# Patient Record
Sex: MALE | Race: WHITE | Employment: FULL TIME | URBAN - METROPOLITAN AREA
[De-identification: names, ages, dates, MRNs, and addresses within clinical notes are randomized per-mention and may not be internally consistent; named-entity substitution may affect disease eponyms.]

---

## 2017-01-23 ENCOUNTER — TELEPHONE (OUTPATIENT)
Dept: PHARMACY | Facility: CLINIC | Age: 22
End: 2017-01-23

## 2020-01-04 ENCOUNTER — HOSPITAL ENCOUNTER (OUTPATIENT)
Dept: SLEEP CENTER | Age: 25
Discharge: HOME | End: 2020-01-04
Payer: COMMERCIAL

## 2020-01-04 PROCEDURE — 95810 POLYSOM 6/> YRS 4/> PARAM: CPT

## 2020-01-05 VITALS
SYSTOLIC BLOOD PRESSURE: 151 MMHG | BODY MASS INDEX: 27.83 KG/M2 | OXYGEN SATURATION: 98 % | WEIGHT: 210 LBS | HEIGHT: 73 IN | DIASTOLIC BLOOD PRESSURE: 104 MMHG | HEART RATE: 87 BPM

## 2020-01-05 ASSESSMENT — SLEEP AND FATIGUE QUESTIONNAIRES
HOW LIKELY ARE YOU TO NOD OFF OR FALL ASLEEP WHEN YOU ARE A PASSENGER IN A CAR FOR AN HOUR WITHOUT A BREAK: 1
HOW LIKELY ARE YOU TO NOD OFF OR FALL ASLEEP WHILE SITTING AND TALKING TO SOMEONE: 0
ESS TOTAL SCORE: 8
HOW LIKELY ARE YOU TO NOD OFF OR FALL ASLEEP WHILE SITTING AND READING: 2
HOW LIKELY ARE YOU TO NOD OFF OR FALL ASLEEP WHILE WATCHING TV: 2
HOW LIKELY ARE YOU TO NOD OFF OR FALL ASLEEP WHILE LYING DOWN TO REST IN THE AFTERNOON WHEN CIRCUMSTANCES PERMIT: 2
HOW LIKELY ARE YOU TO NOD OFF OR FALL ASLEEP WHILE SITTING INACTIVE IN A PUBLIC PLACE: 0
HOW LIKELY ARE YOU TO NOD OFF OR FALL ASLEEP IN A CAR, WHILE STOPPED FOR A FEW MINUTES IN TRAFFIC: 0
HOW LIKELY ARE YOU TO NOD OFF OR FALL ASLEEP WHILE SITTING QUIETLY AFTER LUNCH WITHOUT ALCOHOL: 1

## 2020-01-07 NOTE — PROGRESS NOTES
[address] [address]                               SLEEP STUDY REPORT    PATIENT NAME: Hudson Thompson                :        1995  MED REC NO:   [id number]                            ROOM:  ACCOUNT NO:   [de-identified]                           ADMIT DATE: 2020  PROVIDER:     Marta Coleman MD    DATE OF STUDY:  2020    REFERRING PROVIDER:  Patient of Blue Moe MD    INDICATION FOR POLYSOMNOGRAPHY:  Witnessed apnea, snoring, nocturnal  diaphoresis, bruxism, frequent waking to urinate, sleep walking, eating  while sleeping. CURRENT MEDICATIONS:  Humalog, Nanine Maser. INTERPRETATION:  SLEEP ARCHITECTURE:  This patient had a total time in bed of 472  minutes. Total sleep time was 400 minutes. Sleep efficiency was 85%. Sleep latency was 53 minutes. REM latency was 101 minutes. SLEEP STAGING:  The patient was awake 15% of the time in bed. Stage N1  was 4% and N2 was 58% of the total sleep time. Slow wave sleep was 18%  of the sleep time and stage REM sleep was 20% of the sleep time. RESPIRATION SUMMARY:  APNEA:  There were 3 apneic events including 1 central and 2  obstructive, all occurred during non-REM stage sleep, and maximum  duration was 13 seconds. HYPOPNEA:  There were 50 hypopneic events, 19 occurred during REM stage  sleep. Maximum duration was 56 seconds. APNEA/HYPOPNEA INDEX:  The apnea/hypopnea index is 8. Of the 53  respiratory events, 35 occurred in the supine position. AROUSAL ANALYSIS:  There were 31 arousals/awakenings, the sleep  disruption index is normal.    LIMB MOVEMENT SUMMARY:  There was 1 limb movement, the limb movement  index is normal.    OXYGEN SATURATION:  Average oxygen saturation while awake was 97%. Lowest saturation was 88%. The patient spent less than 1% of the time  with saturation less than 90%.     HEART RATE SUMMARY:  Average heart rate while awake was 92 beats per  minute. Maximum heart rate during sleep was 121 beats per minute, and  minimum heart rate during sleep was 62 beats per minute. MISCELLANEOUS:  [place] Sleepiness Scale score is 8/24. Snoring was  mild. This was graded as 3 on a 1 to 10 scale. There was no apparent  bruxism. IMPRESSION:  1. Mild obstructive sleep apnea. 2.  Good oxygen saturation. 3.  No cardiac dysrhythmia. 4.  Possible parasomnia with sleep walking and sleep eating. 5.  Mild snoring. DISCUSSION:  This patient has an apnea/hypopnea index of 8. Normal is  less than 5, and mild is 5-15 leaving this patient in the mild category. When the [place] Sleepiness Scale score is less than 10, frequently we  do not treat mild disease. However, there are some exceptions, and  therefore, before making a final decision, the patient will be seen in  the office. PLAN:  1. No treatment at this time. 2.  The patient to be seen in the office to discuss results of the study  and determine if further evaluation/treatment is indicated.         Pedrito Greenberg MD  Diplomat of Sleep Medicine    D: 01/06/2020 19:53:13       T: 01/06/2020 19:56:37     [id number]  Job#: [id number]     Doc#: [id number]    CC:

## 2020-06-02 ENCOUNTER — HOSPITAL ENCOUNTER (OUTPATIENT)
Age: 25
Discharge: HOME | End: 2020-06-04
Payer: COMMERCIAL

## 2020-06-02 LAB
ALBUMIN SERPL-MCNC: 4.3 G/DL (ref 3.5–5.2)
ALP BLD-CCNC: 102 U/L (ref 40–129)
ALT SERPL-CCNC: 67 U/L (ref 0–40)
ANION GAP SERPL CALCULATED.3IONS-SCNC: 21 MMOL/L (ref 7–16)
AST SERPL-CCNC: 53 U/L (ref 0–39)
BILIRUB SERPL-MCNC: 0.6 MG/DL (ref 0–1.2)
BUN BLDV-MCNC: 13 MG/DL (ref 6–20)
CALCIUM SERPL-MCNC: 9.4 MG/DL (ref 8.6–10.2)
CHLORIDE BLD-SCNC: 99 MMOL/L (ref 98–107)
CHOLESTEROL, TOTAL: 230 MG/DL (ref 0–199)
CO2: 19 MMOL/L (ref 22–29)
CREAT SERPL-MCNC: 1 MG/DL (ref 0.7–1.2)
GFR AFRICAN AMERICAN: >60
GFR NON-AFRICAN AMERICAN: >60 ML/MIN/1.73
GLUCOSE BLD-MCNC: 401 MG/DL (ref 74–99)
HBA1C MFR BLD: 8.7 % (ref 4–5.6)
HDLC SERPL-MCNC: 29 MG/DL
LDL CHOLESTEROL CALCULATED: 136 MG/DL (ref 0–99)
POTASSIUM SERPL-SCNC: 5 MMOL/L (ref 3.5–5)
SODIUM BLD-SCNC: 139 MMOL/L (ref 132–146)
TOTAL PROTEIN: 7.1 G/DL (ref 6.4–8.3)
TRIGL SERPL-MCNC: 327 MG/DL (ref 0–149)
TSH SERPL DL<=0.05 MIU/L-ACNC: 2.84 UIU/ML (ref 0.27–4.2)
VITAMIN D 25-HYDROXY: 23 NG/ML (ref 30–100)
VLDLC SERPL CALC-MCNC: 65 MG/DL

## 2020-06-02 PROCEDURE — 80053 COMPREHEN METABOLIC PANEL: CPT

## 2020-06-02 PROCEDURE — 80061 LIPID PANEL: CPT

## 2020-06-02 PROCEDURE — 82306 VITAMIN D 25 HYDROXY: CPT

## 2020-06-02 PROCEDURE — 83036 HEMOGLOBIN GLYCOSYLATED A1C: CPT

## 2020-06-02 PROCEDURE — 84443 ASSAY THYROID STIM HORMONE: CPT

## 2020-12-04 LAB
ALBUMIN SERPL-MCNC: 4.6 G/DL (ref 3.5–5.2)
ALP BLD-CCNC: 89 U/L (ref 40–129)
ALT SERPL-CCNC: 28 U/L (ref 0–40)
ANION GAP SERPL CALCULATED.3IONS-SCNC: 14 MMOL/L (ref 7–16)
AST SERPL-CCNC: 24 U/L (ref 0–39)
BASOPHILS ABSOLUTE: 0.03 E9/L (ref 0–0.2)
BASOPHILS RELATIVE PERCENT: 0.4 % (ref 0–2)
BILIRUB SERPL-MCNC: 0.4 MG/DL (ref 0–1.2)
BUN BLDV-MCNC: 12 MG/DL (ref 6–20)
CALCIUM SERPL-MCNC: 9.9 MG/DL (ref 8.6–10.2)
CHLORIDE BLD-SCNC: 104 MMOL/L (ref 98–107)
CHOLESTEROL, TOTAL: 225 MG/DL (ref 0–199)
CO2: 25 MMOL/L (ref 22–29)
CREAT SERPL-MCNC: 0.9 MG/DL (ref 0.7–1.2)
EOSINOPHILS ABSOLUTE: 0.07 E9/L (ref 0.05–0.5)
EOSINOPHILS RELATIVE PERCENT: 0.9 % (ref 0–6)
GFR AFRICAN AMERICAN: >60
GFR NON-AFRICAN AMERICAN: >60 ML/MIN/1.73
GLUCOSE BLD-MCNC: 43 MG/DL (ref 74–99)
HCT VFR BLD CALC: 47.2 % (ref 37–54)
HDLC SERPL-MCNC: 36 MG/DL
HEMOGLOBIN: 15.6 G/DL (ref 12.5–16.5)
IMMATURE GRANULOCYTES #: 0.01 E9/L
IMMATURE GRANULOCYTES %: 0.1 % (ref 0–5)
LDL CHOLESTEROL CALCULATED: 140 MG/DL (ref 0–99)
LYMPHOCYTES ABSOLUTE: 2.59 E9/L (ref 1.5–4)
LYMPHOCYTES RELATIVE PERCENT: 34 % (ref 20–42)
MCH RBC QN AUTO: 30.6 PG (ref 26–35)
MCHC RBC AUTO-ENTMCNC: 33.1 % (ref 32–34.5)
MCV RBC AUTO: 92.7 FL (ref 80–99.9)
MONOCYTES ABSOLUTE: 0.37 E9/L (ref 0.1–0.95)
MONOCYTES RELATIVE PERCENT: 4.9 % (ref 2–12)
NEUTROPHILS ABSOLUTE: 4.55 E9/L (ref 1.8–7.3)
NEUTROPHILS RELATIVE PERCENT: 59.7 % (ref 43–80)
PDW BLD-RTO: 12.5 FL (ref 11.5–15)
PLATELET # BLD: 168 E9/L (ref 130–450)
PMV BLD AUTO: 13.7 FL (ref 7–12)
POTASSIUM SERPL-SCNC: 4 MMOL/L (ref 3.5–5)
RBC # BLD: 5.09 E12/L (ref 3.8–5.8)
SODIUM BLD-SCNC: 143 MMOL/L (ref 132–146)
TOTAL PROTEIN: 7.3 G/DL (ref 6.4–8.3)
TRIGL SERPL-MCNC: 247 MG/DL (ref 0–149)
TSH SERPL DL<=0.05 MIU/L-ACNC: 1.91 UIU/ML (ref 0.27–4.2)
VITAMIN D 25-HYDROXY: 22 NG/ML (ref 30–100)
VLDLC SERPL CALC-MCNC: 49 MG/DL
WBC # BLD: 7.6 E9/L (ref 4.5–11.5)

## 2020-12-05 LAB — HBA1C MFR BLD: 8.5 % (ref 4–5.6)

## 2021-06-04 LAB
ALBUMIN SERPL-MCNC: 4.3 G/DL (ref 3.5–5.2)
ALP BLD-CCNC: 84 U/L (ref 40–129)
ALT SERPL-CCNC: 32 U/L (ref 0–40)
ANION GAP SERPL CALCULATED.3IONS-SCNC: 13 MMOL/L (ref 7–16)
AST SERPL-CCNC: 26 U/L (ref 0–39)
BILIRUB SERPL-MCNC: 0.3 MG/DL (ref 0–1.2)
BUN BLDV-MCNC: 12 MG/DL (ref 6–20)
CALCIUM SERPL-MCNC: 9.6 MG/DL (ref 8.6–10.2)
CHLORIDE BLD-SCNC: 102 MMOL/L (ref 98–107)
CHOLESTEROL, TOTAL: 223 MG/DL (ref 0–199)
CO2: 24 MMOL/L (ref 22–29)
CREAT SERPL-MCNC: 0.8 MG/DL (ref 0.7–1.2)
GFR AFRICAN AMERICAN: >60
GFR NON-AFRICAN AMERICAN: >60 ML/MIN/1.73
GLUCOSE BLD-MCNC: 149 MG/DL (ref 74–99)
HBA1C MFR BLD: 8.3 % (ref 4–5.6)
HDLC SERPL-MCNC: 33 MG/DL
LDL CHOLESTEROL CALCULATED: 135 MG/DL (ref 0–99)
POTASSIUM SERPL-SCNC: 4.7 MMOL/L (ref 3.5–5)
SODIUM BLD-SCNC: 139 MMOL/L (ref 132–146)
TOTAL PROTEIN: 7 G/DL (ref 6.4–8.3)
TRIGL SERPL-MCNC: 275 MG/DL (ref 0–149)
TSH SERPL DL<=0.05 MIU/L-ACNC: 2.71 UIU/ML (ref 0.27–4.2)
VITAMIN D 25-HYDROXY: 24 NG/ML (ref 30–100)
VLDLC SERPL CALC-MCNC: 55 MG/DL

## 2021-09-10 ENCOUNTER — HOSPITAL ENCOUNTER (EMERGENCY)
Age: 26
Discharge: HOME | End: 2021-09-10
Attending: EMERGENCY MEDICINE
Payer: COMMERCIAL

## 2021-09-10 VITALS
HEART RATE: 116 BPM | SYSTOLIC BLOOD PRESSURE: 154 MMHG | TEMPERATURE: 98.4 F | BODY MASS INDEX: 27.09 KG/M2 | OXYGEN SATURATION: 98 % | RESPIRATION RATE: 16 BRPM | HEIGHT: 72 IN | WEIGHT: 200 LBS | DIASTOLIC BLOOD PRESSURE: 99 MMHG

## 2021-09-10 DIAGNOSIS — Z91.89 AT INCREASED RISK OF EXPOSURE TO COVID-19 VIRUS: Primary | ICD-10-CM

## 2021-09-10 LAB — SARS-COV-2, NAAT: NOT DETECTED

## 2021-09-10 PROCEDURE — 87635 SARS-COV-2 COVID-19 AMP PRB: CPT

## 2021-09-10 PROCEDURE — 99282 EMERGENCY DEPT VISIT SF MDM: CPT

## 2021-09-10 ASSESSMENT — ENCOUNTER SYMPTOMS
EYE DISCHARGE: 0
EYE PAIN: 0
BACK PAIN: 0
WHEEZING: 0
NAUSEA: 0
EYE REDNESS: 0
SINUS PRESSURE: 0
DIARRHEA: 0
VOMITING: 0
SORE THROAT: 0
ABDOMINAL PAIN: 0
COUGH: 0
SHORTNESS OF BREATH: 0

## 2021-09-10 NOTE — ED PROVIDER NOTES
81-year-old male presents to the emergency department after a high risk Covid exposure. The patient has nasal congestion but no fever. Patient does have history of type 1 diabetes he is denies nausea vomiting diarrhea or abnormal blood sugars. He states no other complaints at this time. The history is provided by the patient. URI  Presenting symptoms: congestion    Presenting symptoms: no cough, no ear pain, no fever and no sore throat    Severity:  Mild  Onset quality:  Gradual  Duration:  1 day  Timing:  Intermittent  Progression:  Waxing and waning  Chronicity:  New  Relieved by:  Nothing  Worsened by:  Nothing  Ineffective treatments:  None tried  Associated symptoms: no arthralgias, no headaches and no wheezing    Risk factors: recent illness         Review of Systems   Constitutional: Negative for chills and fever. HENT: Positive for congestion. Negative for ear pain, sinus pressure and sore throat. Eyes: Negative for pain, discharge and redness. Respiratory: Negative for cough, shortness of breath and wheezing. Cardiovascular: Negative for chest pain. Gastrointestinal: Negative for abdominal pain, diarrhea, nausea and vomiting. Genitourinary: Negative for dysuria and frequency. Musculoskeletal: Negative for arthralgias and back pain. Skin: Negative for rash and wound. Neurological: Negative for weakness and headaches. Hematological: Negative for adenopathy. All other systems reviewed and are negative. Physical Exam  Constitutional:       Appearance: Normal appearance. HENT:      Head: Normocephalic and atraumatic. Nose: Nose normal.   Eyes:      Extraocular Movements: Extraocular movements intact. Pupils: Pupils are equal, round, and reactive to light. Cardiovascular:      Rate and Rhythm: Normal rate and regular rhythm. Pulses: Normal pulses. Heart sounds: Normal heart sounds.    Pulmonary:      Effort: Pulmonary effort is normal.      Breath sounds: Normal breath sounds. Abdominal:      General: Abdomen is flat. Bowel sounds are normal. There is no distension. Palpations: Abdomen is soft. Tenderness: There is no abdominal tenderness. There is no guarding. Musculoskeletal:         General: Normal range of motion. Skin:     General: Skin is warm. Capillary Refill: Capillary refill takes less than 2 seconds. Neurological:      General: No focal deficit present. Mental Status: He is alert and oriented to person, place, and time. Procedures     MDM  Number of Diagnoses or Management Options  At increased risk of exposure to COVID-19 virus  Diagnosis management comments: Patient seen and examined he is not hypoxic Covid test sent patient was discharged with outpatient follow-up.             --------------------------------------------- PAST HISTORY ---------------------------------------------  Past Medical History:  has a past medical history of Diabetes mellitus ([facility] [address] and Lactic acid increased. Past Surgical History:  has a past surgical history that includes Appendectomy. Social History:  reports that he has never smoked. His smokeless tobacco use includes chew. He reports that he does not drink alcohol. Family History: family history is not on file. The patients home medications have been reviewed. Allergies: Niacin and related    -------------------------------------------------- RESULTS -------------------------------------------------  Labs:  No results found for this visit on 09/10/21. Radiology:  No orders to display       ------------------------- NURSING NOTES AND VITALS REVIEWED ---------------------------  Date / Time Roomed:  9/10/2021  6:20 PM  ED Bed Assignment:  29/29    The nursing notes within the ED encounter and vital signs as below have been reviewed.    BP (!) 154/99   Pulse 116   Temp 98.4 °F (36.9 °C)   Resp 16   Ht 6' (1.829 m)   Wt 200 lb (90.7 kg)   SpO2 98%   BMI 27.12 kg/m²   Oxygen Saturation Interpretation: Normal      ------------------------------------------ PROGRESS NOTES ------------------------------------------  I have spoken with the patient and discussed todays results, in addition to providing specific details for the plan of care and counseling regarding the diagnosis and prognosis. Their questions are answered at this time and they are agreeable with the plan. I discussed at length with them reasons for immediate return here for re evaluation. They will followup with their primary care physician by calling their office tomorrow. --------------------------------- ADDITIONAL PROVIDER NOTES ---------------------------------  At this time the patient is without objective evidence of an acute process requiring hospitalization or inpatient management. They have remained hemodynamically stable throughout their entire ED visit and are stable for discharge with outpatient follow-up. The plan has been discussed in detail and they are aware of the specific conditions for emergent return, as well as the importance of follow-up. New Prescriptions    No medications on file       Diagnosis:  1. At increased risk of exposure to COVID-19 virus        Disposition:  Patient's disposition: Discharge to home  Patient's condition is stable.        Themarcio Gamboa, DO  09/10/21 [address], DO  09/10/21 0807

## 2021-09-13 ENCOUNTER — CARE COORDINATION (OUTPATIENT)
Dept: OTHER | Facility: CLINIC | Age: 26
End: 2021-09-13

## 2021-09-13 NOTE — CARE COORDINATION
[address] [facility] ED Follow Up Call    2021    Patient: Rosio Choudhary Patient : 1995   MRN: [id number]  Reason for Admission: At Increased Risk of exposure to covid 19  Discharge Date: 9/10/21      Date/Time:  2021 2:22 PM  Attempted to reach patient by telephone. Call within 2 business days of discharge: Yes  Spoke to patient's mother who reports patient is fine. Requested mom provide patient with this CTN's contact info. [employer] message sent. This CTN to sign off if patient does not return call.

## 2021-09-24 ENCOUNTER — APPOINTMENT (OUTPATIENT)
Dept: GENERAL RADIOLOGY | Age: 26
End: 2021-09-24
Payer: COMMERCIAL

## 2021-09-24 ENCOUNTER — HOSPITAL ENCOUNTER (EMERGENCY)
Age: 26
Discharge: HOME | End: 2021-09-24
Attending: EMERGENCY MEDICINE
Payer: COMMERCIAL

## 2021-09-24 VITALS
OXYGEN SATURATION: 97 % | HEIGHT: 72 IN | BODY MASS INDEX: 24.38 KG/M2 | HEART RATE: 90 BPM | SYSTOLIC BLOOD PRESSURE: 124 MMHG | RESPIRATION RATE: 16 BRPM | WEIGHT: 180 LBS | TEMPERATURE: 98.3 F | DIASTOLIC BLOOD PRESSURE: 77 MMHG

## 2021-09-24 DIAGNOSIS — J18.9 PNEUMONIA DUE TO INFECTIOUS ORGANISM, UNSPECIFIED LATERALITY, UNSPECIFIED PART OF LUNG: ICD-10-CM

## 2021-09-24 DIAGNOSIS — U07.1 COVID-19: Primary | ICD-10-CM

## 2021-09-24 DIAGNOSIS — R73.9 HYPERGLYCEMIA: ICD-10-CM

## 2021-09-24 LAB
ALBUMIN SERPL-MCNC: 4 G/DL (ref 3.5–5.2)
ALP BLD-CCNC: 75 U/L (ref 40–129)
ALT SERPL-CCNC: 24 U/L (ref 0–40)
ANION GAP SERPL CALCULATED.3IONS-SCNC: 19 MMOL/L (ref 7–16)
AST SERPL-CCNC: 25 U/L (ref 0–39)
BACTERIA: ABNORMAL /HPF
BASOPHILS ABSOLUTE: 0.01 E9/L (ref 0–0.2)
BASOPHILS RELATIVE PERCENT: 0.2 % (ref 0–2)
BETA-HYDROXYBUTYRATE: 2.7 MMOL/L (ref 0.02–0.27)
BILIRUB SERPL-MCNC: 0.5 MG/DL (ref 0–1.2)
BILIRUBIN URINE: ABNORMAL
BLOOD, URINE: ABNORMAL
BUN BLDV-MCNC: 17 MG/DL (ref 6–20)
C-REACTIVE PROTEIN: 4 MG/DL (ref 0–0.4)
CALCIUM SERPL-MCNC: 8.8 MG/DL (ref 8.6–10.2)
CHLORIDE BLD-SCNC: 94 MMOL/L (ref 98–107)
CLARITY: CLEAR
CO2: 20 MMOL/L (ref 22–29)
COLOR: YELLOW
CREAT SERPL-MCNC: 1.1 MG/DL (ref 0.7–1.2)
D DIMER: <200 NG/ML DDU
EKG ATRIAL RATE: 97 BPM
EKG P AXIS: 55 DEGREES
EKG P-R INTERVAL: 148 MS
EKG Q-T INTERVAL: 348 MS
EKG QRS DURATION: 80 MS
EKG QTC CALCULATION (BAZETT): 441 MS
EKG R AXIS: 29 DEGREES
EKG T AXIS: 23 DEGREES
EKG VENTRICULAR RATE: 97 BPM
EOSINOPHILS ABSOLUTE: 0 E9/L (ref 0.05–0.5)
EOSINOPHILS RELATIVE PERCENT: 0 % (ref 0–6)
FERRITIN: 684 NG/ML
GFR AFRICAN AMERICAN: >60
GFR NON-AFRICAN AMERICAN: >60 ML/MIN/1.73
GLUCOSE BLD-MCNC: 308 MG/DL (ref 74–99)
GLUCOSE URINE: >=1000 MG/DL
HCT VFR BLD CALC: 44.9 % (ref 37–54)
HEMOGLOBIN: 15.6 G/DL (ref 12.5–16.5)
IMMATURE GRANULOCYTES #: 0.02 E9/L
IMMATURE GRANULOCYTES %: 0.4 % (ref 0–5)
KETONES, URINE: >=80 MG/DL
LACTATE DEHYDROGENASE: 201 U/L (ref 135–225)
LACTIC ACID: 3.6 MMOL/L (ref 0.5–2.2)
LEUKOCYTE ESTERASE, URINE: NEGATIVE
LYMPHOCYTES ABSOLUTE: 1.18 E9/L (ref 1.5–4)
LYMPHOCYTES RELATIVE PERCENT: 23 % (ref 20–42)
MCH RBC QN AUTO: 30.8 PG (ref 26–35)
MCHC RBC AUTO-ENTMCNC: 34.7 % (ref 32–34.5)
MCV RBC AUTO: 88.6 FL (ref 80–99.9)
MONOCYTES ABSOLUTE: 0.38 E9/L (ref 0.1–0.95)
MONOCYTES RELATIVE PERCENT: 7.4 % (ref 2–12)
NEUTROPHILS ABSOLUTE: 3.54 E9/L (ref 1.8–7.3)
NEUTROPHILS RELATIVE PERCENT: 69 % (ref 43–80)
NITRITE, URINE: NEGATIVE
PDW BLD-RTO: 12.3 FL (ref 11.5–15)
PH UA: 5.5 (ref 5–9)
PH VENOUS: 7.4 (ref 7.35–7.45)
PLATELET # BLD: 103 E9/L (ref 130–450)
PMV BLD AUTO: 12.8 FL (ref 7–12)
POTASSIUM SERPL-SCNC: 4.2 MMOL/L (ref 3.5–5)
PROCALCITONIN: 0.46 NG/ML (ref 0–0.08)
PROTEIN UA: NEGATIVE MG/DL
RBC # BLD: 5.07 E12/L (ref 3.8–5.8)
RBC UA: ABNORMAL /HPF (ref 0–2)
SARS-COV-2, NAAT: DETECTED
SODIUM BLD-SCNC: 133 MMOL/L (ref 132–146)
SPECIFIC GRAVITY UA: 1.02 (ref 1–1.03)
TOTAL CK: 61 U/L (ref 20–200)
TOTAL PROTEIN: 7 G/DL (ref 6.4–8.3)
UROBILINOGEN, URINE: 0.2 E.U./DL
WBC # BLD: 5.1 E9/L (ref 4.5–11.5)
WBC UA: ABNORMAL /HPF (ref 0–5)

## 2021-09-24 PROCEDURE — 96374 THER/PROPH/DIAG INJ IV PUSH: CPT

## 2021-09-24 PROCEDURE — 87088 URINE BACTERIA CULTURE: CPT

## 2021-09-24 PROCEDURE — 80053 COMPREHEN METABOLIC PANEL: CPT

## 2021-09-24 PROCEDURE — 93005 ELECTROCARDIOGRAM TRACING: CPT | Performed by: EMERGENCY MEDICINE

## 2021-09-24 PROCEDURE — 36415 COLL VENOUS BLD VENIPUNCTURE: CPT

## 2021-09-24 PROCEDURE — 82010 KETONE BODYS QUAN: CPT

## 2021-09-24 PROCEDURE — 6370000000 HC RX 637 (ALT 250 FOR IP): Performed by: EMERGENCY MEDICINE

## 2021-09-24 PROCEDURE — 81001 URINALYSIS AUTO W/SCOPE: CPT

## 2021-09-24 PROCEDURE — 87040 BLOOD CULTURE FOR BACTERIA: CPT

## 2021-09-24 PROCEDURE — 84145 PROCALCITONIN (PCT): CPT

## 2021-09-24 PROCEDURE — 6360000002 HC RX W HCPCS: Performed by: EMERGENCY MEDICINE

## 2021-09-24 PROCEDURE — 2580000003 HC RX 258: Performed by: EMERGENCY MEDICINE

## 2021-09-24 PROCEDURE — 87635 SARS-COV-2 COVID-19 AMP PRB: CPT

## 2021-09-24 PROCEDURE — 85378 FIBRIN DEGRADE SEMIQUANT: CPT

## 2021-09-24 PROCEDURE — 83615 LACTATE (LD) (LDH) ENZYME: CPT

## 2021-09-24 PROCEDURE — 82550 ASSAY OF CK (CPK): CPT

## 2021-09-24 PROCEDURE — 96375 TX/PRO/DX INJ NEW DRUG ADDON: CPT

## 2021-09-24 PROCEDURE — 86140 C-REACTIVE PROTEIN: CPT

## 2021-09-24 PROCEDURE — 85025 COMPLETE CBC W/AUTO DIFF WBC: CPT

## 2021-09-24 PROCEDURE — 71045 X-RAY EXAM CHEST 1 VIEW: CPT

## 2021-09-24 PROCEDURE — 83605 ASSAY OF LACTIC ACID: CPT

## 2021-09-24 PROCEDURE — 82800 BLOOD PH: CPT

## 2021-09-24 PROCEDURE — 82728 ASSAY OF FERRITIN: CPT

## 2021-09-24 PROCEDURE — 99285 EMERGENCY DEPT VISIT HI MDM: CPT

## 2021-09-24 RX ORDER — DOXYCYCLINE HYCLATE 100 MG
100 TABLET ORAL 2 TIMES DAILY
Qty: 20 TABLET | Refills: 0 | Status: ON HOLD | OUTPATIENT
Start: 2021-09-24 | End: 2021-09-29 | Stop reason: HOSPADM

## 2021-09-24 RX ORDER — ALBUTEROL SULFATE 90 UG/1
2 AEROSOL, METERED RESPIRATORY (INHALATION) EVERY 4 HOURS PRN
Qty: 18 G | Refills: 0 | Status: SHIPPED | OUTPATIENT
Start: 2021-09-24

## 2021-09-24 RX ORDER — ONDANSETRON 2 MG/ML
4 INJECTION INTRAMUSCULAR; INTRAVENOUS ONCE
Status: COMPLETED | OUTPATIENT
Start: 2021-09-24 | End: 2021-09-24

## 2021-09-24 RX ORDER — GUAIFENESIN AND CODEINE PHOSPHATE 100; 10 MG/5ML; MG/5ML
5 SOLUTION ORAL 3 TIMES DAILY PRN
Qty: 105 ML | Refills: 0 | Status: SHIPPED | OUTPATIENT
Start: 2021-09-24 | End: 2021-10-01

## 2021-09-24 RX ORDER — 0.9 % SODIUM CHLORIDE 0.9 %
1000 INTRAVENOUS SOLUTION INTRAVENOUS ONCE
Status: COMPLETED | OUTPATIENT
Start: 2021-09-24 | End: 2021-09-24

## 2021-09-24 RX ORDER — ACETAMINOPHEN 500 MG
1000 TABLET ORAL ONCE
Status: COMPLETED | OUTPATIENT
Start: 2021-09-24 | End: 2021-09-24

## 2021-09-24 RX ORDER — ONDANSETRON 4 MG/1
4 TABLET, ORALLY DISINTEGRATING ORAL EVERY 8 HOURS PRN
Qty: 30 TABLET | Refills: 0 | Status: SHIPPED | OUTPATIENT
Start: 2021-09-24

## 2021-09-24 RX ORDER — IBUPROFEN 800 MG/1
800 TABLET ORAL EVERY 8 HOURS PRN
Qty: 12 TABLET | Refills: 0 | Status: SHIPPED | OUTPATIENT
Start: 2021-09-24

## 2021-09-24 RX ADMIN — SODIUM CHLORIDE 1000 ML: 9 INJECTION, SOLUTION INTRAVENOUS at 09:56

## 2021-09-24 RX ADMIN — INSULIN HUMAN 6 UNITS: 100 INJECTION, SOLUTION PARENTERAL at 11:34

## 2021-09-24 RX ADMIN — ACETAMINOPHEN 1000 MG: 500 TABLET ORAL at 10:03

## 2021-09-24 RX ADMIN — SODIUM CHLORIDE 1000 ML: 9 INJECTION, SOLUTION INTRAVENOUS at 11:30

## 2021-09-24 RX ADMIN — ONDANSETRON 4 MG: 2 INJECTION INTRAMUSCULAR; INTRAVENOUS at 10:03

## 2021-09-24 ASSESSMENT — PAIN SCALES - GENERAL: PAINLEVEL_OUTOF10: 5

## 2021-09-24 ASSESSMENT — PAIN DESCRIPTION - PAIN TYPE: TYPE: ACUTE PAIN

## 2021-09-24 ASSESSMENT — PAIN DESCRIPTION - LOCATION: LOCATION: BACK

## 2021-09-24 NOTE — ED NOTES
Bed: 02  Expected date:   Expected time:   Means of arrival:   Comments:  yun Mahoney RN  09/24/21 2105

## 2021-09-24 NOTE — ED PROVIDER NOTES
rhythm, no murmurs, no gallops, no rubs. Radial pulses 2+ bilaterally. GI:  Soft, nondistended, normal bowel sounds, nontender, no organomegaly, no mass, no rebound, no guarding   :  No costovertebral angle tenderness   Musculoskeletal:  No edema, no tenderness, no deformities. Back- no tenderness  Integument:  Well hydrated, no visible rash. Adequate perfusion. Lymphatic:  No cervical lymphadenopathy noted   Neurologic:  Alert & oriented x 3, CN 2-12 normal, no focal deficits noted. Normal gait. Psychiatric:  Speech and behavior appropriate       -------------------------------------------------- RESULTS -------------------------------------------------  I have personally reviewed all laboratory and imaging results for this patient. Results are listed below.      LABS:  Results for orders placed or performed during the hospital encounter of 09/24/21   COVID-19, Rapid    Specimen: Nasopharyngeal Swab   Result Value Ref Range    SARS-CoV-2, NAAT DETECTED (A) Not Detected   Urinalysis with Microscopic   Result Value Ref Range    Color, UA Yellow Straw/Yellow    Clarity, UA Clear Clear    Glucose, Ur >=1000 (A) Negative mg/dL    Bilirubin Urine SMALL (A) Negative    Ketones, Urine >=80 (A) Negative mg/dL    Specific Gravity, UA 1.020 1.005 - 1.030    Blood, Urine TRACE-INTACT Negative    pH, UA 5.5 5.0 - 9.0    Protein, UA Negative Negative mg/dL    Urobilinogen, Urine 0.2 <2.0 E.U./dL    Nitrite, Urine Negative Negative    Leukocyte Esterase, Urine Negative Negative    WBC, UA NONE 0 - 5 /HPF    RBC, UA NONE 0 - 2 /HPF    Bacteria, UA NONE SEEN None Seen /HPF   PH, VENOUS   Result Value Ref Range    pH, [place] 7.40 7.35 - 7.45   Beta-Hydroxybutyrate   Result Value Ref Range    Beta-Hydroxybutyrate 2.70 (H) 0.02 - 0.27 mmol/L   CBC auto differential   Result Value Ref Range    WBC 5.1 4.5 - 11.5 E9/L    RBC 5.07 3.80 - 5.80 E12/L    Hemoglobin 15.6 12.5 - 16.5 g/dL    Hematocrit 44.9 37.0 - 54.0 %    MCV 88.6 80.0 - 99.9 fL    MCH 30.8 26.0 - 35.0 pg    MCHC 34.7 (H) 32.0 - 34.5 %    RDW 12.3 11.5 - 15.0 fL    Platelets 046 (L) 644 - 450 E9/L    MPV 12.8 (H) 7.0 - 12.0 fL    Neutrophils % 69.0 43.0 - 80.0 %    Immature Granulocytes % 0.4 0.0 - 5.0 %    Lymphocytes % 23.0 20.0 - 42.0 %    Monocytes % 7.4 2.0 - 12.0 %    Eosinophils % 0.0 0.0 - 6.0 %    Basophils % 0.2 0.0 - 2.0 %    Neutrophils Absolute 3.54 1.80 - 7.30 E9/L    Immature Granulocytes # 0.02 E9/L    Lymphocytes Absolute 1.18 (L) 1.50 - 4.00 E9/L    Monocytes Absolute 0.38 0.10 - 0.95 E9/L    Eosinophils Absolute 0.00 (L) 0.05 - 0.50 E9/L    Basophils Absolute 0.01 0.00 - 0.20 E9/L   Comprehensive Metabolic Panel   Result Value Ref Range    Sodium 133 132 - 146 mmol/L    Potassium 4.2 3.5 - 5.0 mmol/L    Chloride 94 (L) 98 - 107 mmol/L    CO2 20 (L) 22 - 29 mmol/L    Anion Gap 19 (H) 7 - 16 mmol/L    Glucose 308 (H) 74 - 99 mg/dL    BUN 17 6 - 20 mg/dL    CREATININE 1.1 0.7 - 1.2 mg/dL    GFR Non-African American >60 >=60 mL/min/1.73    GFR African American >60     Calcium 8.8 8.6 - 10.2 mg/dL    Total Protein 7.0 6.4 - 8.3 g/dL    Albumin 4.0 3.5 - 5.2 g/dL    Total Bilirubin 0.5 0.0 - 1.2 mg/dL    Alkaline Phosphatase 75 40 - 129 U/L    ALT 24 0 - 40 U/L    AST 25 0 - 39 U/L   Lactic Acid, Plasma   Result Value Ref Range    Lactic Acid 3.6 (HH) 0.5 - 2.2 mmol/L   CK   Result Value Ref Range    Total CK 61 20 - 200 U/L   D-Dimer, Quantitative   Result Value Ref Range    D-Dimer, Quant <200 ng/mL DDU   LACTATE DEHYDROGENASE   Result Value Ref Range     135 - 225 U/L   EKG 12 Lead   Result Value Ref Range    Ventricular Rate 97 BPM    Atrial Rate 97 BPM    P-R Interval 148 ms    QRS Duration 80 ms    Q-T Interval 348 ms    QTc Calculation (Bazett) 441 ms    P Axis 55 degrees    R Axis 29 degrees    T Axis 23 degrees       RADIOLOGY:  Interpreted by Radiologist.  XR CHEST PORTABLE   Final Result   Patchy airspace disease in the right perihilar region and right lung base may   represent pneumonia. EKG Interpretation  Time: 1:09 PM EDT  Rhythm: normal sinus   Rate: 95  Axis: normal  Conduction: normal  ST Segments: no acute change  T Waves: no acute change  Clinical Impression: no acute changes  Comparison to prior EKG: None      ------------------------- NURSING NOTES AND VITALS REVIEWED ---------------------------  The nursing notes within the ED encounter and vital signs as below have been reviewed by myself. /77   Pulse 90   Temp 98.3 °F (36.8 °C) (Oral)   Resp 16   Ht 6' (1.829 m)   Wt 180 lb (81.6 kg)   SpO2 97%   BMI 24.41 kg/m²   Oxygen Saturation Interpretation: Normal      The patients available past medical records and past encounters were reviewed. ------------------------------ ED COURSE/MEDICAL DECISION MAKING----------------------  Medications   0.9 % sodium chloride bolus (0 mLs IntraVENous Stopped 9/24/21 1130)   acetaminophen (TYLENOL) tablet 1,000 mg (1,000 mg Oral Given 9/24/21 1003)   ondansetron (ZOFRAN) injection 4 mg (4 mg IntraVENous Given 9/24/21 1003)   0.9 % sodium chloride bolus (0 mLs IntraVENous Stopped 9/24/21 1213)   insulin regular (HUMULIN R;NOVOLIN R) injection 6 Units (6 Units IntraVENous Given 9/24/21 1134)           Procedures:   none      Medical Decision Making:    covid 19   Pna on CXR - covid related vs bacterial.  Will order doxycycline in the event it is a superimposed bacteria. Will order [facility] for cough with safe opiate use and driving restrictions reviewed. No steroids as patient is diabetic. He was advised to monitor blood sugar closely at least 3 times a day as he is infected. Albuterol inhaler prn. Zofran prn. He appears well, no hypoxia, nontoxic, blood sugar is improving. IV hydration provided. He is neurovascularly intact and ambulatory upon discharge. Patient was explicitly instructed on specific signs and symptoms on which to return to the emergency room for. Patient was instructed to return to the ER for any new or worsening symptoms. Additional discharge instructions were given verbally. All questions were answered. Patient is comfortable and agreeable with discharge plan. Patient in no acute distress and non-toxic in appearance. This patient's ED course included: re-evaluation prior to disposition, cardiac monitoring, continuous pulse oximetry and a personal history and physicial eaxmination    This patient has remained hemodynamically stable, improved and been closely monitored during their ED course. Re-Evaluations:  Time: 1:12 PM EDT   Re-evaluation. Patients symptoms are improving  Repeat physical examination is improved      Consultations:   none    Critical Care: none    IOlivia, , am the Primary Provider of Record    Counseling: The emergency provider has spoken with the patient and mother and discussed todays results, in addition to providing specific details for the plan of care and counseling regarding the diagnosis and prognosis. Questions are answered at this time and they are agreeable with the plan.    --------------------------- IMPRESSION AND DISPOSITION ---------------------------------    IMPRESSION  1. COVID-19    2. Hyperglycemia    3.  Pneumonia due to infectious organism, unspecified laterality, unspecified part of lung        DISPOSITION  Disposition: Discharge to home  Patient condition is stable              Kristin Means DO  09/24/21 1316

## 2021-09-26 LAB — URINE CULTURE, ROUTINE: NORMAL

## 2021-09-27 ENCOUNTER — HOSPITAL ENCOUNTER (OUTPATIENT)
Age: 26
Setting detail: OBSERVATION
Discharge: HOME | End: 2021-09-29
Attending: EMERGENCY MEDICINE | Admitting: INTERNAL MEDICINE
Payer: COMMERCIAL

## 2021-09-27 ENCOUNTER — APPOINTMENT (OUTPATIENT)
Dept: CT IMAGING | Age: 26
End: 2021-09-27
Payer: COMMERCIAL

## 2021-09-27 ENCOUNTER — CARE COORDINATION (OUTPATIENT)
Dept: OTHER | Facility: CLINIC | Age: 26
End: 2021-09-27

## 2021-09-27 DIAGNOSIS — E87.20 METABOLIC ACIDOSIS: ICD-10-CM

## 2021-09-27 DIAGNOSIS — U07.1 COVID-19: ICD-10-CM

## 2021-09-27 DIAGNOSIS — E10.9 TYPE 1 DIABETES MELLITUS WITHOUT COMPLICATION (HCC): ICD-10-CM

## 2021-09-27 DIAGNOSIS — R11.2 INTRACTABLE NAUSEA AND VOMITING: Primary | ICD-10-CM

## 2021-09-27 LAB
ALBUMIN SERPL-MCNC: 4.2 G/DL (ref 3.5–5.2)
ALP BLD-CCNC: 80 U/L (ref 40–129)
ALT SERPL-CCNC: 22 U/L (ref 0–40)
ANION GAP SERPL CALCULATED.3IONS-SCNC: 27 MMOL/L (ref 7–16)
AST SERPL-CCNC: 27 U/L (ref 0–39)
BASOPHILS ABSOLUTE: 0.02 E9/L (ref 0–0.2)
BASOPHILS RELATIVE PERCENT: 0.2 % (ref 0–2)
BETA-HYDROXYBUTYRATE: 0.05 MMOL/L (ref 0.02–0.27)
BILIRUB SERPL-MCNC: 0.3 MG/DL (ref 0–1.2)
BUN BLDV-MCNC: 15 MG/DL (ref 6–20)
CALCIUM SERPL-MCNC: 9.7 MG/DL (ref 8.6–10.2)
CHLORIDE BLD-SCNC: 95 MMOL/L (ref 98–107)
CO2: 14 MMOL/L (ref 22–29)
CREAT SERPL-MCNC: 1 MG/DL (ref 0.7–1.2)
D DIMER: 391 NG/ML DDU
EOSINOPHILS ABSOLUTE: 0 E9/L (ref 0.05–0.5)
EOSINOPHILS RELATIVE PERCENT: 0 % (ref 0–6)
GFR AFRICAN AMERICAN: >60
GFR NON-AFRICAN AMERICAN: >60 ML/MIN/1.73
GLUCOSE BLD-MCNC: 191 MG/DL (ref 74–99)
HCT VFR BLD CALC: 51.2 % (ref 37–54)
HEMOGLOBIN: 17.5 G/DL (ref 12.5–16.5)
IMMATURE GRANULOCYTES #: 0.02 E9/L
IMMATURE GRANULOCYTES %: 0.2 % (ref 0–5)
LACTIC ACID: 2.9 MMOL/L (ref 0.5–2.2)
LYMPHOCYTES ABSOLUTE: 0.95 E9/L (ref 1.5–4)
LYMPHOCYTES RELATIVE PERCENT: 10.2 % (ref 20–42)
MCH RBC QN AUTO: 30.6 PG (ref 26–35)
MCHC RBC AUTO-ENTMCNC: 34.2 % (ref 32–34.5)
MCV RBC AUTO: 89.5 FL (ref 80–99.9)
MONOCYTES ABSOLUTE: 0.34 E9/L (ref 0.1–0.95)
MONOCYTES RELATIVE PERCENT: 3.7 % (ref 2–12)
NEUTROPHILS ABSOLUTE: 7.94 E9/L (ref 1.8–7.3)
NEUTROPHILS RELATIVE PERCENT: 85.7 % (ref 43–80)
PDW BLD-RTO: 12.4 FL (ref 11.5–15)
PH VENOUS: 7.3 (ref 7.35–7.45)
PLATELET # BLD: 136 E9/L (ref 130–450)
PMV BLD AUTO: 12.9 FL (ref 7–12)
POTASSIUM REFLEX MAGNESIUM: 4 MMOL/L (ref 3.5–5)
RBC # BLD: 5.72 E12/L (ref 3.8–5.8)
SODIUM BLD-SCNC: 136 MMOL/L (ref 132–146)
TOTAL PROTEIN: 8 G/DL (ref 6.4–8.3)
WBC # BLD: 9.3 E9/L (ref 4.5–11.5)

## 2021-09-27 PROCEDURE — 82800 BLOOD PH: CPT

## 2021-09-27 PROCEDURE — 82010 KETONE BODYS QUAN: CPT

## 2021-09-27 PROCEDURE — 85378 FIBRIN DEGRADE SEMIQUANT: CPT

## 2021-09-27 PROCEDURE — 6360000002 HC RX W HCPCS: Performed by: EMERGENCY MEDICINE

## 2021-09-27 PROCEDURE — 96361 HYDRATE IV INFUSION ADD-ON: CPT

## 2021-09-27 PROCEDURE — 71260 CT THORAX DX C+: CPT

## 2021-09-27 PROCEDURE — 96374 THER/PROPH/DIAG INJ IV PUSH: CPT

## 2021-09-27 PROCEDURE — 85025 COMPLETE CBC W/AUTO DIFF WBC: CPT

## 2021-09-27 PROCEDURE — 2580000003 HC RX 258: Performed by: EMERGENCY MEDICINE

## 2021-09-27 PROCEDURE — 83605 ASSAY OF LACTIC ACID: CPT

## 2021-09-27 PROCEDURE — 6360000004 HC RX CONTRAST MEDICATION: Performed by: RADIOLOGY

## 2021-09-27 PROCEDURE — 93005 ELECTROCARDIOGRAM TRACING: CPT | Performed by: EMERGENCY MEDICINE

## 2021-09-27 PROCEDURE — 6370000000 HC RX 637 (ALT 250 FOR IP): Performed by: EMERGENCY MEDICINE

## 2021-09-27 PROCEDURE — 96375 TX/PRO/DX INJ NEW DRUG ADDON: CPT

## 2021-09-27 PROCEDURE — 96372 THER/PROPH/DIAG INJ SC/IM: CPT

## 2021-09-27 PROCEDURE — 99285 EMERGENCY DEPT VISIT HI MDM: CPT

## 2021-09-27 PROCEDURE — 80053 COMPREHEN METABOLIC PANEL: CPT

## 2021-09-27 RX ORDER — SODIUM CHLORIDE 9 MG/ML
1000 INJECTION, SOLUTION INTRAVENOUS CONTINUOUS
Status: DISCONTINUED | OUTPATIENT
Start: 2021-09-27 | End: 2021-09-28

## 2021-09-27 RX ORDER — 0.9 % SODIUM CHLORIDE 0.9 %
1000 INTRAVENOUS SOLUTION INTRAVENOUS ONCE
Status: COMPLETED | OUTPATIENT
Start: 2021-09-27 | End: 2021-09-27

## 2021-09-27 RX ORDER — ONDANSETRON 2 MG/ML
4 INJECTION INTRAMUSCULAR; INTRAVENOUS ONCE
Status: COMPLETED | OUTPATIENT
Start: 2021-09-27 | End: 2021-09-27

## 2021-09-27 RX ORDER — ACETAMINOPHEN 500 MG
1000 TABLET ORAL ONCE
Status: COMPLETED | OUTPATIENT
Start: 2021-09-27 | End: 2021-09-27

## 2021-09-27 RX ORDER — KETOROLAC TROMETHAMINE 30 MG/ML
15 INJECTION, SOLUTION INTRAMUSCULAR; INTRAVENOUS ONCE
Status: COMPLETED | OUTPATIENT
Start: 2021-09-27 | End: 2021-09-27

## 2021-09-27 RX ORDER — PROMETHAZINE HYDROCHLORIDE 25 MG/ML
25 INJECTION, SOLUTION INTRAMUSCULAR; INTRAVENOUS ONCE
Status: COMPLETED | OUTPATIENT
Start: 2021-09-27 | End: 2021-09-27

## 2021-09-27 RX ADMIN — PROMETHAZINE HYDROCHLORIDE 25 MG: 25 INJECTION INTRAMUSCULAR; INTRAVENOUS at 19:29

## 2021-09-27 RX ADMIN — SODIUM CHLORIDE 1000 ML: 9 INJECTION, SOLUTION INTRAVENOUS at 22:53

## 2021-09-27 RX ADMIN — SODIUM CHLORIDE 1000 ML: 9 INJECTION, SOLUTION INTRAVENOUS at 19:32

## 2021-09-27 RX ADMIN — ONDANSETRON 4 MG: 2 INJECTION INTRAMUSCULAR; INTRAVENOUS at 22:53

## 2021-09-27 RX ADMIN — KETOROLAC TROMETHAMINE 15 MG: 30 INJECTION, SOLUTION INTRAMUSCULAR at 19:33

## 2021-09-27 RX ADMIN — SODIUM CHLORIDE 1000 ML: 9 INJECTION, SOLUTION INTRAVENOUS at 20:28

## 2021-09-27 RX ADMIN — ACETAMINOPHEN 1000 MG: 500 TABLET ORAL at 20:26

## 2021-09-27 RX ADMIN — IOPAMIDOL 75 ML: 755 INJECTION, SOLUTION INTRAVENOUS at 20:43

## 2021-09-27 ASSESSMENT — PAIN DESCRIPTION - PAIN TYPE: TYPE: ACUTE PAIN

## 2021-09-27 ASSESSMENT — PAIN SCALES - GENERAL
PAINLEVEL_OUTOF10: 8
PAINLEVEL_OUTOF10: 4

## 2021-09-27 ASSESSMENT — PAIN DESCRIPTION - LOCATION: LOCATION: ABDOMEN

## 2021-09-27 NOTE — CARE COORDINATION
[address] [facility] ED Follow Up Call    2021    Patient: Giorgio Choudhary Patient : 1995   MRN: [id number]  Reason for Admission: CVOVID +/ Hyperglycemia  Discharge Date: 21    Ambulatory Care Manager (ACM) attempted to contact the patient and spouse, Ngozi Capellan by telephone to perform post ED visit assessment. Left HIPPA compliant message providing introduction to self and requested a call back. Will continue to outreach to patient. Will send [employer] message in the meantime.         Care Transitions ED Follow Up    Care Transitions Interventions  Did you call your PCP prior to going to the ED?: No - Did not call PCP

## 2021-09-28 PROBLEM — R11.2 INTRACTABLE NAUSEA AND VOMITING: Status: ACTIVE | Noted: 2021-09-28

## 2021-09-28 PROBLEM — E86.0 SEVERE DEHYDRATION: Status: ACTIVE | Noted: 2021-09-28

## 2021-09-28 PROBLEM — U07.1 COVID-19: Status: ACTIVE | Noted: 2021-09-28

## 2021-09-28 LAB
ALBUMIN SERPL-MCNC: 3.2 G/DL (ref 3.5–5.2)
ALP BLD-CCNC: 59 U/L (ref 40–129)
ALT SERPL-CCNC: 17 U/L (ref 0–40)
ANION GAP SERPL CALCULATED.3IONS-SCNC: 18 MMOL/L (ref 7–16)
ANION GAP SERPL CALCULATED.3IONS-SCNC: 21 MMOL/L (ref 7–16)
APTT: 26.8 SEC (ref 24.5–35.1)
AST SERPL-CCNC: 38 U/L (ref 0–39)
BASOPHILS ABSOLUTE: 0.01 E9/L (ref 0–0.2)
BASOPHILS RELATIVE PERCENT: 0.1 % (ref 0–2)
BILIRUB SERPL-MCNC: 0.3 MG/DL (ref 0–1.2)
BUN BLDV-MCNC: 14 MG/DL (ref 6–20)
BUN BLDV-MCNC: 14 MG/DL (ref 6–20)
C-REACTIVE PROTEIN: 14.4 MG/DL (ref 0–0.4)
CALCIUM SERPL-MCNC: 8.4 MG/DL (ref 8.6–10.2)
CALCIUM SERPL-MCNC: 8.5 MG/DL (ref 8.6–10.2)
CHLORIDE BLD-SCNC: 102 MMOL/L (ref 98–107)
CHLORIDE BLD-SCNC: 104 MMOL/L (ref 98–107)
CHP ED QC CHECK: YES
CO2: 15 MMOL/L (ref 22–29)
CO2: 17 MMOL/L (ref 22–29)
CREAT SERPL-MCNC: 1 MG/DL (ref 0.7–1.2)
CREAT SERPL-MCNC: 1 MG/DL (ref 0.7–1.2)
D DIMER: 530 NG/ML DDU
EKG ATRIAL RATE: 125 BPM
EKG P AXIS: 58 DEGREES
EKG P-R INTERVAL: 130 MS
EKG Q-T INTERVAL: 300 MS
EKG QRS DURATION: 80 MS
EKG QTC CALCULATION (BAZETT): 433 MS
EKG R AXIS: 57 DEGREES
EKG T AXIS: 37 DEGREES
EKG VENTRICULAR RATE: 125 BPM
EOSINOPHILS ABSOLUTE: 0 E9/L (ref 0.05–0.5)
EOSINOPHILS RELATIVE PERCENT: 0 % (ref 0–6)
FERRITIN: 1007 NG/ML
FIBRINOGEN: >700 MG/DL (ref 225–540)
GFR AFRICAN AMERICAN: >60
GFR AFRICAN AMERICAN: >60
GFR NON-AFRICAN AMERICAN: >60 ML/MIN/1.73
GFR NON-AFRICAN AMERICAN: >60 ML/MIN/1.73
GLUCOSE BLD-MCNC: 177 MG/DL
GLUCOSE BLD-MCNC: 249 MG/DL (ref 74–99)
GLUCOSE BLD-MCNC: 284 MG/DL (ref 74–99)
HCT VFR BLD CALC: 42.2 % (ref 37–54)
HEMOGLOBIN: 14 G/DL (ref 12.5–16.5)
IMMATURE GRANULOCYTES #: 0.03 E9/L
IMMATURE GRANULOCYTES %: 0.4 % (ref 0–5)
INR BLD: 1.1
LACTATE DEHYDROGENASE: 464 U/L (ref 135–225)
LACTIC ACID: 1.7 MMOL/L (ref 0.5–2.2)
LYMPHOCYTES ABSOLUTE: 1.13 E9/L (ref 1.5–4)
LYMPHOCYTES RELATIVE PERCENT: 15.3 % (ref 20–42)
MCH RBC QN AUTO: 30.4 PG (ref 26–35)
MCHC RBC AUTO-ENTMCNC: 33.2 % (ref 32–34.5)
MCV RBC AUTO: 91.7 FL (ref 80–99.9)
METER GLUCOSE: 177 MG/DL (ref 74–99)
METER GLUCOSE: 240 MG/DL (ref 74–99)
METER GLUCOSE: 260 MG/DL (ref 74–99)
METER GLUCOSE: 318 MG/DL (ref 74–99)
MONOCYTES ABSOLUTE: 0.33 E9/L (ref 0.1–0.95)
MONOCYTES RELATIVE PERCENT: 4.5 % (ref 2–12)
NEUTROPHILS ABSOLUTE: 5.88 E9/L (ref 1.8–7.3)
NEUTROPHILS RELATIVE PERCENT: 79.7 % (ref 43–80)
PDW BLD-RTO: 12.5 FL (ref 11.5–15)
PLATELET # BLD: 122 E9/L (ref 130–450)
PMV BLD AUTO: 12.7 FL (ref 7–12)
POTASSIUM REFLEX MAGNESIUM: 4.4 MMOL/L (ref 3.5–5)
POTASSIUM REFLEX MAGNESIUM: 4.7 MMOL/L (ref 3.5–5)
PROCALCITONIN: 0.73 NG/ML (ref 0–0.08)
PROTHROMBIN TIME: 12.2 SEC (ref 9.3–12.4)
RBC # BLD: 4.6 E12/L (ref 3.8–5.8)
SODIUM BLD-SCNC: 137 MMOL/L (ref 132–146)
SODIUM BLD-SCNC: 140 MMOL/L (ref 132–146)
TOTAL PROTEIN: 6.2 G/DL (ref 6.4–8.3)
TROPONIN, HIGH SENSITIVITY: 12 NG/L (ref 0–11)
VITAMIN D 25-HYDROXY: 16 NG/ML (ref 30–100)
WBC # BLD: 7.4 E9/L (ref 4.5–11.5)

## 2021-09-28 PROCEDURE — 2580000003 HC RX 258: Performed by: INTERNAL MEDICINE

## 2021-09-28 PROCEDURE — 85378 FIBRIN DEGRADE SEMIQUANT: CPT

## 2021-09-28 PROCEDURE — 86140 C-REACTIVE PROTEIN: CPT

## 2021-09-28 PROCEDURE — 36415 COLL VENOUS BLD VENIPUNCTURE: CPT

## 2021-09-28 PROCEDURE — 84145 PROCALCITONIN (PCT): CPT

## 2021-09-28 PROCEDURE — 80053 COMPREHEN METABOLIC PANEL: CPT

## 2021-09-28 PROCEDURE — 85610 PROTHROMBIN TIME: CPT

## 2021-09-28 PROCEDURE — 96376 TX/PRO/DX INJ SAME DRUG ADON: CPT

## 2021-09-28 PROCEDURE — 80048 BASIC METABOLIC PNL TOTAL CA: CPT

## 2021-09-28 PROCEDURE — C9113 INJ PANTOPRAZOLE SODIUM, VIA: HCPCS | Performed by: INTERNAL MEDICINE

## 2021-09-28 PROCEDURE — 82728 ASSAY OF FERRITIN: CPT

## 2021-09-28 PROCEDURE — 6360000002 HC RX W HCPCS: Performed by: EMERGENCY MEDICINE

## 2021-09-28 PROCEDURE — 85025 COMPLETE CBC W/AUTO DIFF WBC: CPT

## 2021-09-28 PROCEDURE — 83605 ASSAY OF LACTIC ACID: CPT

## 2021-09-28 PROCEDURE — 6360000002 HC RX W HCPCS: Performed by: INTERNAL MEDICINE

## 2021-09-28 PROCEDURE — 82962 GLUCOSE BLOOD TEST: CPT

## 2021-09-28 PROCEDURE — G0378 HOSPITAL OBSERVATION PER HR: HCPCS

## 2021-09-28 PROCEDURE — 96372 THER/PROPH/DIAG INJ SC/IM: CPT

## 2021-09-28 PROCEDURE — 93010 ELECTROCARDIOGRAM REPORT: CPT | Performed by: INTERNAL MEDICINE

## 2021-09-28 PROCEDURE — 85730 THROMBOPLASTIN TIME PARTIAL: CPT

## 2021-09-28 PROCEDURE — 83615 LACTATE (LD) (LDH) ENZYME: CPT

## 2021-09-28 PROCEDURE — 6370000000 HC RX 637 (ALT 250 FOR IP): Performed by: INTERNAL MEDICINE

## 2021-09-28 PROCEDURE — 96375 TX/PRO/DX INJ NEW DRUG ADDON: CPT

## 2021-09-28 PROCEDURE — 82306 VITAMIN D 25 HYDROXY: CPT

## 2021-09-28 PROCEDURE — 84484 ASSAY OF TROPONIN QUANT: CPT

## 2021-09-28 PROCEDURE — 96361 HYDRATE IV INFUSION ADD-ON: CPT

## 2021-09-28 PROCEDURE — 85384 FIBRINOGEN ACTIVITY: CPT

## 2021-09-28 PROCEDURE — 96365 THER/PROPH/DIAG IV INF INIT: CPT

## 2021-09-28 PROCEDURE — 6370000000 HC RX 637 (ALT 250 FOR IP): Performed by: NURSE PRACTITIONER

## 2021-09-28 RX ORDER — SODIUM CHLORIDE 0.9 % (FLUSH) 0.9 %
10 SYRINGE (ML) INJECTION EVERY 12 HOURS SCHEDULED
Status: DISCONTINUED | OUTPATIENT
Start: 2021-09-28 | End: 2021-09-29 | Stop reason: HOSPADM

## 2021-09-28 RX ORDER — POTASSIUM CHLORIDE 20 MEQ/1
40 TABLET, EXTENDED RELEASE ORAL PRN
Status: DISCONTINUED | OUTPATIENT
Start: 2021-09-28 | End: 2021-09-29 | Stop reason: HOSPADM

## 2021-09-28 RX ORDER — DEXTROSE MONOHYDRATE 25 G/50ML
12.5 INJECTION, SOLUTION INTRAVENOUS PRN
Status: DISCONTINUED | OUTPATIENT
Start: 2021-09-28 | End: 2021-09-29 | Stop reason: HOSPADM

## 2021-09-28 RX ORDER — PANTOPRAZOLE SODIUM 40 MG/10ML
40 INJECTION, POWDER, LYOPHILIZED, FOR SOLUTION INTRAVENOUS 2 TIMES DAILY
Status: DISCONTINUED | OUTPATIENT
Start: 2021-09-28 | End: 2021-09-29 | Stop reason: HOSPADM

## 2021-09-28 RX ORDER — INSULIN LISPRO 100 [IU]/ML
INJECTION, SOLUTION INTRAVENOUS; SUBCUTANEOUS 3 TIMES DAILY
COMMUNITY

## 2021-09-28 RX ORDER — FLUCONAZOLE 100 MG/1
200 TABLET ORAL DAILY
Status: DISCONTINUED | OUTPATIENT
Start: 2021-09-28 | End: 2021-09-29 | Stop reason: HOSPADM

## 2021-09-28 RX ORDER — LEVOFLOXACIN 5 MG/ML
750 INJECTION, SOLUTION INTRAVENOUS EVERY 24 HOURS
Status: DISCONTINUED | OUTPATIENT
Start: 2021-09-28 | End: 2021-09-29 | Stop reason: HOSPADM

## 2021-09-28 RX ORDER — METOCLOPRAMIDE HYDROCHLORIDE 5 MG/ML
10 INJECTION INTRAMUSCULAR; INTRAVENOUS ONCE
Status: COMPLETED | OUTPATIENT
Start: 2021-09-28 | End: 2021-09-28

## 2021-09-28 RX ORDER — GUAIFENESIN/DEXTROMETHORPHAN 100-10MG/5
5 SYRUP ORAL EVERY 4 HOURS PRN
Status: DISCONTINUED | OUTPATIENT
Start: 2021-09-28 | End: 2021-09-29 | Stop reason: HOSPADM

## 2021-09-28 RX ORDER — ONDANSETRON 4 MG/1
4 TABLET, ORALLY DISINTEGRATING ORAL EVERY 8 HOURS PRN
Status: DISCONTINUED | OUTPATIENT
Start: 2021-09-28 | End: 2021-09-29 | Stop reason: HOSPADM

## 2021-09-28 RX ORDER — METOCLOPRAMIDE HYDROCHLORIDE 5 MG/ML
5 INJECTION INTRAMUSCULAR; INTRAVENOUS EVERY 6 HOURS PRN
Status: DISCONTINUED | OUTPATIENT
Start: 2021-09-28 | End: 2021-09-29 | Stop reason: HOSPADM

## 2021-09-28 RX ORDER — INSULIN GLARGINE 100 [IU]/ML
48 INJECTION, SOLUTION SUBCUTANEOUS NIGHTLY
Status: DISCONTINUED | OUTPATIENT
Start: 2021-09-28 | End: 2021-09-29 | Stop reason: HOSPADM

## 2021-09-28 RX ORDER — ACETAMINOPHEN 325 MG/1
650 TABLET ORAL EVERY 6 HOURS PRN
Status: DISCONTINUED | OUTPATIENT
Start: 2021-09-28 | End: 2021-09-29 | Stop reason: HOSPADM

## 2021-09-28 RX ORDER — ONDANSETRON 2 MG/ML
4 INJECTION INTRAMUSCULAR; INTRAVENOUS EVERY 6 HOURS PRN
Status: DISCONTINUED | OUTPATIENT
Start: 2021-09-28 | End: 2021-09-28

## 2021-09-28 RX ORDER — DEXAMETHASONE SODIUM PHOSPHATE 4 MG/ML
3 INJECTION, SOLUTION INTRA-ARTICULAR; INTRALESIONAL; INTRAMUSCULAR; INTRAVENOUS; SOFT TISSUE DAILY
Status: DISCONTINUED | OUTPATIENT
Start: 2021-09-28 | End: 2021-09-29

## 2021-09-28 RX ORDER — NICOTINE POLACRILEX 4 MG
15 LOZENGE BUCCAL PRN
Status: DISCONTINUED | OUTPATIENT
Start: 2021-09-28 | End: 2021-09-29 | Stop reason: HOSPADM

## 2021-09-28 RX ORDER — SODIUM CHLORIDE 9 MG/ML
INJECTION, SOLUTION INTRAVENOUS CONTINUOUS
Status: DISCONTINUED | OUTPATIENT
Start: 2021-09-28 | End: 2021-09-29 | Stop reason: HOSPADM

## 2021-09-28 RX ORDER — ACETAMINOPHEN 325 MG/1
650 TABLET ORAL EVERY 6 HOURS PRN
Status: DISCONTINUED | OUTPATIENT
Start: 2021-09-28 | End: 2021-09-28

## 2021-09-28 RX ORDER — PROMETHAZINE HYDROCHLORIDE 25 MG/ML
25 INJECTION, SOLUTION INTRAMUSCULAR; INTRAVENOUS ONCE
Status: COMPLETED | OUTPATIENT
Start: 2021-09-28 | End: 2021-09-28

## 2021-09-28 RX ORDER — VITAMIN B COMPLEX
2000 TABLET ORAL DAILY
Status: DISCONTINUED | OUTPATIENT
Start: 2021-09-28 | End: 2021-09-29 | Stop reason: HOSPADM

## 2021-09-28 RX ORDER — DEXTROSE MONOHYDRATE 50 MG/ML
100 INJECTION, SOLUTION INTRAVENOUS PRN
Status: DISCONTINUED | OUTPATIENT
Start: 2021-09-28 | End: 2021-09-29 | Stop reason: HOSPADM

## 2021-09-28 RX ORDER — SODIUM CHLORIDE 0.9 % (FLUSH) 0.9 %
10 SYRINGE (ML) INJECTION PRN
Status: DISCONTINUED | OUTPATIENT
Start: 2021-09-28 | End: 2021-09-29 | Stop reason: HOSPADM

## 2021-09-28 RX ORDER — ACETAMINOPHEN 650 MG/1
650 SUPPOSITORY RECTAL EVERY 6 HOURS PRN
Status: DISCONTINUED | OUTPATIENT
Start: 2021-09-28 | End: 2021-09-28

## 2021-09-28 RX ORDER — SODIUM CHLORIDE 9 MG/ML
25 INJECTION, SOLUTION INTRAVENOUS PRN
Status: DISCONTINUED | OUTPATIENT
Start: 2021-09-28 | End: 2021-09-29 | Stop reason: HOSPADM

## 2021-09-28 RX ORDER — SENNA PLUS 8.6 MG/1
1 TABLET ORAL DAILY PRN
Status: DISCONTINUED | OUTPATIENT
Start: 2021-09-28 | End: 2021-09-29 | Stop reason: HOSPADM

## 2021-09-28 RX ORDER — ALBUTEROL SULFATE 90 UG/1
2 AEROSOL, METERED RESPIRATORY (INHALATION) EVERY 4 HOURS PRN
Status: DISCONTINUED | OUTPATIENT
Start: 2021-09-28 | End: 2021-09-29 | Stop reason: HOSPADM

## 2021-09-28 RX ORDER — POTASSIUM CHLORIDE 7.45 MG/ML
10 INJECTION INTRAVENOUS PRN
Status: DISCONTINUED | OUTPATIENT
Start: 2021-09-28 | End: 2021-09-29 | Stop reason: HOSPADM

## 2021-09-28 RX ORDER — ONDANSETRON 2 MG/ML
4 INJECTION INTRAMUSCULAR; INTRAVENOUS EVERY 6 HOURS PRN
Status: DISCONTINUED | OUTPATIENT
Start: 2021-09-28 | End: 2021-09-29 | Stop reason: HOSPADM

## 2021-09-28 RX ORDER — ACETAMINOPHEN 650 MG/1
650 SUPPOSITORY RECTAL EVERY 6 HOURS PRN
Status: DISCONTINUED | OUTPATIENT
Start: 2021-09-28 | End: 2021-09-29 | Stop reason: HOSPADM

## 2021-09-28 RX ADMIN — INSULIN LISPRO 6 UNITS: 100 INJECTION, SOLUTION INTRAVENOUS; SUBCUTANEOUS at 17:32

## 2021-09-28 RX ADMIN — PANTOPRAZOLE SODIUM 40 MG: 40 INJECTION, POWDER, FOR SOLUTION INTRAVENOUS at 20:58

## 2021-09-28 RX ADMIN — FLUCONAZOLE 200 MG: 100 TABLET ORAL at 16:27

## 2021-09-28 RX ADMIN — PROMETHAZINE HYDROCHLORIDE 25 MG: 25 INJECTION INTRAMUSCULAR; INTRAVENOUS at 03:01

## 2021-09-28 RX ADMIN — METOCLOPRAMIDE HYDROCHLORIDE 5 MG: 5 INJECTION INTRAMUSCULAR; INTRAVENOUS at 11:10

## 2021-09-28 RX ADMIN — SODIUM CHLORIDE: 9 INJECTION, SOLUTION INTRAVENOUS at 04:13

## 2021-09-28 RX ADMIN — SODIUM CHLORIDE, PRESERVATIVE FREE 10 ML: 5 INJECTION INTRAVENOUS at 20:57

## 2021-09-28 RX ADMIN — Medication 2000 UNITS: at 11:10

## 2021-09-28 RX ADMIN — SODIUM CHLORIDE: 9 INJECTION, SOLUTION INTRAVENOUS at 20:58

## 2021-09-28 RX ADMIN — INSULIN GLARGINE 48 UNITS: 100 INJECTION, SOLUTION SUBCUTANEOUS at 21:02

## 2021-09-28 RX ADMIN — INSULIN LISPRO 8 UNITS: 100 INJECTION, SOLUTION INTRAVENOUS; SUBCUTANEOUS at 11:30

## 2021-09-28 RX ADMIN — SODIUM CHLORIDE, PRESERVATIVE FREE 10 ML: 5 INJECTION INTRAVENOUS at 11:10

## 2021-09-28 RX ADMIN — INSULIN LISPRO 2 UNITS: 100 INJECTION, SOLUTION INTRAVENOUS; SUBCUTANEOUS at 21:02

## 2021-09-28 RX ADMIN — ENOXAPARIN SODIUM 30 MG: 30 INJECTION, SOLUTION INTRAVENOUS; SUBCUTANEOUS at 20:58

## 2021-09-28 RX ADMIN — METOCLOPRAMIDE HYDROCHLORIDE 5 MG: 5 INJECTION INTRAMUSCULAR; INTRAVENOUS at 20:58

## 2021-09-28 RX ADMIN — DEXAMETHASONE SODIUM PHOSPHATE 3 MG: 4 INJECTION, SOLUTION INTRAMUSCULAR; INTRAVENOUS at 13:04

## 2021-09-28 RX ADMIN — PANTOPRAZOLE SODIUM 40 MG: 40 INJECTION, POWDER, FOR SOLUTION INTRAVENOUS at 11:10

## 2021-09-28 RX ADMIN — LEVOFLOXACIN 750 MG: 5 INJECTION, SOLUTION INTRAVENOUS at 13:05

## 2021-09-28 RX ADMIN — ENOXAPARIN SODIUM 30 MG: 30 INJECTION, SOLUTION INTRAVENOUS; SUBCUTANEOUS at 11:10

## 2021-09-28 RX ADMIN — METOCLOPRAMIDE HYDROCHLORIDE 10 MG: 5 INJECTION INTRAMUSCULAR; INTRAVENOUS at 05:04

## 2021-09-28 ASSESSMENT — PAIN SCALES - GENERAL
PAINLEVEL_OUTOF10: 0
PAINLEVEL_OUTOF10: 0

## 2021-09-28 NOTE — ED NOTES
Patient vomited approximately 150ml of liquid green emesis. Medicated per order for nausea.      Yoselin Quiñones RN  09/28/21 8164

## 2021-09-28 NOTE — CONSULTS
Gastroenterology Consult Note   FLAVIO Mills NP-C with Becka Patel M.D. Consult Note        Date of Service: 9/28/2021  Reason for Consult: Intractable nausea and vomiting  Requesting Physician: Dr. Philomena Rdz: Intractable nausea and vomiting    History Obtained From:  Patient and EMR    HISTORY OF PRESENT ILLNESS:    Teleservice done in [facility] COVID-19 isolation precautions     Bud Amaya is a 23 y.o. male with significant past medical history of DM admitted via ED for metabolic acidosis, positive Covid 19. Pt reports he came to the ED for evaluation of nausea and vomiting on Friday. Patient states he was having nausea and vomiting for 4 days and at that time tested positive for Covid. Patient was seen at the ED on 9/24 and was discharged with Zofran. Symptoms were still uncontrolled and patient return to ED on 9/27. Patient states he has not been able to eat without vomiting and is continually nauseous. Emesis described as yellow bile. Patient states he has epigastric pain described as sharp and rated 8/10. Last bowel movement yesterday described as loose and brown. Patient denies any endoscopic work-up in the past. Patient denies fever, chills, weight loss, melena, hematochezia or hematemesis. Admission labs chloride 95; CO2 14; anion gap 27; lactic acid 2.9; glucose 191. .  Consultation for Intractable nausea and vomiting. Currently, pt reports continued epigastric pain described as sharp rated 8/10. Patient unable to tolerate diet. States he is nauseated and vomited this morning described as yellow bile. No bowel movement today, + flatus. Labs today Beman 3.2; total protein 6.2; CO2 15; anion gap 21; glucose 284.     Past Medical History:        Diagnosis Date    Diabetes mellitus ([facility] [address]     Lactic acid increased 6/12/2016     Past Surgical History:        Procedure Laterality Date    APPENDECTOMY       Current Medications:    Current Facility-Administered Medications: 0.9 % sodium chloride infusion, , IntraVENous, Continuous  acetaminophen (TYLENOL) tablet 650 mg, 650 mg, Oral, Q6H PRN **OR** acetaminophen (TYLENOL) suppository 650 mg, 650 mg, Rectal, Q6H PRN  guaiFENesin-dextromethorphan (ROBITUSSIN DM) 100-10 MG/5ML syrup 5 mL, 5 mL, Oral, Q4H PRN  Vitamin D (CHOLECALCIFEROL) tablet 2,000 Units, 2,000 Units, Oral, Daily  sodium chloride flush 0.9 % injection 10 mL, 10 mL, IntraVENous, 2 times per day  sodium chloride flush 0.9 % injection 10 mL, 10 mL, IntraVENous, PRN  0.9 % sodium chloride infusion, 25 mL, IntraVENous, PRN  potassium chloride (KLOR-CON M) extended release tablet 40 mEq, 40 mEq, Oral, PRN **OR** potassium bicarb-citric acid (EFFER-K) effervescent tablet 40 mEq, 40 mEq, Oral, PRN **OR** potassium chloride 10 mEq/100 mL IVPB (Peripheral Line), 10 mEq, IntraVENous, PRN  enoxaparin (LOVENOX) injection 30 mg, 30 mg, SubCUTAneous, BID  ondansetron (ZOFRAN-ODT) disintegrating tablet 4 mg, 4 mg, Oral, Q8H PRN **OR** ondansetron (ZOFRAN) injection 4 mg, 4 mg, IntraVENous, Q6H PRN  senna (SENOKOT) tablet 8.6 mg, 1 tablet, Oral, Daily PRN  insulin lispro (HUMALOG) injection vial 0-12 Units, 0-12 Units, SubCUTAneous, TID WC  insulin lispro (HUMALOG) injection vial 0-6 Units, 0-6 Units, SubCUTAneous, Nightly  glucose (GLUTOSE) 40 % oral gel 15 g, 15 g, Oral, PRN  dextrose 50 % IV solution, 12.5 g, IntraVENous, PRN  glucagon (rDNA) injection 1 mg, 1 mg, IntraMUSCular, PRN  dextrose 5 % solution, 100 mL/hr, IntraVENous, PRN  albuterol sulfate  (90 Base) MCG/ACT inhaler 2 puff, 2 puff, Inhalation, Q4H PRN  insulin glargine (LANTUS) injection vial 48 Units, 48 Units, SubCUTAneous, Nightly  pantoprazole (PROTONIX) injection 40 mg, 40 mg, IntraVENous, BID  baricitinib (OLUMIANT) tablet 4 mg, 4 mg, Oral, Daily  metoclopramide (REGLAN) injection 5 mg, 5 mg, IntraVENous, Q6H PRN  levoFLOXacin (LEVAQUIN) 750 MG/150ML infusion 750 mg, 750 mg, IntraVENous, Q24H    Allergies: Niacin and related    Social History:    Tobacco:  Pt denies  Alcohol:  Pt denies  Illicit Drugs: Pt denies    Family History:   Parents: Alive; healthy  Children: Alive; healthy    REVIEW OF SYSTEMS:    Aside from what was mentioned in the PMH and HPI, essentially unremarkable, all others negative.     PHYSICAL EXAM:      Vitals:    BP (!) 138/90   Pulse 102   Temp 99.1 °F (37.3 °C) (Oral)   Resp 20   Ht 6' (1.829 m)   Wt 200 lb (90.7 kg)   SpO2 95%   BMI 27.12 kg/m²       Televisit done in lieu of Covid 19 isolation precautions    DATA:    CBC with Differential:    Lab Results   Component Value Date    WBC 7.4 09/28/2021    RBC 4.60 09/28/2021    HGB 14.0 09/28/2021    HCT 42.2 09/28/2021     09/28/2021    MCV 91.7 09/28/2021    MCH 30.4 09/28/2021    MCHC 33.2 09/28/2021    RDW 12.5 09/28/2021    LYMPHOPCT 15.3 09/28/2021    MONOPCT 4.5 09/28/2021    BASOPCT 0.1 09/28/2021    MONOSABS 0.33 09/28/2021    LYMPHSABS 1.13 09/28/2021    EOSABS 0.00 09/28/2021    BASOSABS 0.01 09/28/2021     CMP:    Lab Results   Component Value Date     09/28/2021    K 4.7 09/28/2021     09/28/2021    CO2 15 09/28/2021    BUN 14 09/28/2021    CREATININE 1.0 09/28/2021    GFRAA >60 09/28/2021    LABGLOM >60 09/28/2021    GLUCOSE 284 09/28/2021    PROT 6.2 09/28/2021    LABALBU 3.2 09/28/2021    CALCIUM 8.5 09/28/2021    BILITOT 0.3 09/28/2021    ALKPHOS 59 09/28/2021    AST 38 09/28/2021    ALT 17 09/28/2021     Hepatic Function Panel:    Lab Results   Component Value Date    ALKPHOS 59 09/28/2021    ALT 17 09/28/2021    AST 38 09/28/2021    PROT 6.2 09/28/2021    BILITOT 0.3 09/28/2021    LABALBU 3.2 09/28/2021     PT/INR:    Lab Results   Component Value Date    PROTIME 12.2 09/28/2021    INR 1.1 09/28/2021     PTT:    Lab Results   Component Value Date    APTT 26.8 09/28/2021   [APTT}  Last 3 Troponin:  No results found for: TROPONINI  TSH:    Lab Results   Component Value Date    TSH 2.710 06/04/2021 FERRITIN:    Lab Results   Component Value Date    FERRITIN 1,007 09/28/2021       No components found for: CHLPL  Lab Results   Component Value Date    TRIG 275 (H) 06/04/2021    TRIG 247 (H) 12/04/2020    TRIG 327 (H) 06/02/2020     Lab Results   Component Value Date    HDL 33 06/04/2021    HDL 36 12/04/2020    HDL 29 06/02/2020     Lab Results   Component Value Date    LDLCALC 135 (H) 06/04/2021    LDLCALC 140 (H) 12/04/2020    LDLCALC 136 (H) 06/02/2020     Lab Results   Component Value Date    LABVLDL 55 06/04/2021    LABVLDL 49 12/04/2020    LABVLDL 65 06/02/2020        XR CHEST PORTABLE    Result Date: 9/24/2021  EXAMINATION: ONE XRAY VIEW OF THE CHEST 9/24/2021 10:56 am COMPARISON: Chest, single view 03/03/2015. HISTORY: ORDERING SYSTEM PROVIDED HISTORY: fever FINDINGS: A single portable AP view of the chest was obtained. Heart, mediastinum, and pulmonary vasculature are within normal limits. There are patchy alveolar opacities in the right perihilar region and right lung base. The left lung is grossly clear. Patchy airspace disease in the right perihilar region and right lung base may represent pneumonia. CT CHEST PULMONARY EMBOLISM W CONTRAST    Result Date: 9/27/2021  EXAMINATION: CTA OF THE CHEST 9/27/2021 8:43 pm TECHNIQUE: CTA of the chest was performed after the administration of intravenous contrast.  Multiplanar reformatted images are provided for review. MIP images are provided for review. Dose modulation, iterative reconstruction, and/or weight based adjustment of the mA/kV was utilized to reduce the radiation dose to as low as reasonably achievable. COMPARISON: None.  HISTORY: ORDERING SYSTEM PROVIDED HISTORY: CP, +COVID, Elev dimer TECHNOLOGIST PROVIDED HISTORY: Reason for exam:->CP, +COVID, Elev dimer Decision Support Exception - unselect if not a suspected or confirmed emergency medical condition->Emergency Medical Condition (MA) FINDINGS: Pulmonary Arteries: Pulmonary arteries are adequately opacified for evaluation. No evidence of intraluminal filling defect to suggest pulmonary embolism. Main pulmonary artery is normal in caliber. Mediastinum: There are few prominent but not pathologically enlarged mediastinal and hilar lymph nodes. Heart chambers are not enlarged. No pericardial effusion. No evidence of right heart strain. There is suggestion of diffuse thickening of the mid to distal esophagus. Lungs/pleura: Airway is patent. No endobronchial lesions. Scattered ill-defined mixed airspace/interstitial opacities in the bilateral lungs more pronounced in the right greater than left lower lungs. No pleural effusions or pneumothoraces. No cystic lung parenchymal changes. No bronchiectasis. Upper Abdomen: Limited images of the upper abdomen are unremarkable. Soft Tissues/Bones: The thyroid gland and remaining soft tissue structures of the neck appear unremarkable. No concerning axillary adenopathy. Mild bilateral gynecomastia. Remaining anterior and posterior chest wall is unremarkable. Vertebral body height and alignment is maintained. Multilevel endplate spondylosis. 1.  No evidence of pulmonary embolism. No evidence of right heart strain. 2.  Multifocal ill-defined mixed airspace/interstitial opacities in the bilateral lungs. Imaging features concerning for infection. Commonly reported imaging features of COVID-19 pneumonia are present. Other processes such as influenza pneumonia and organizing pneumonia, as can be seen with drug toxicity and connective tissue disease, can cause a similar imaging pattern. 3.  There is suggestion of diffuse thickening of the mid to distal esophagus. Please see recommendations below. RECOMMENDATIONS: Recommend follow-up chest CT 3-6 months after resolution of patient's symptoms to reassess lung parenchymal changes. After patient recovers from current medical issues, recommend GI consultation.   Upper GI esophagram versus upper GI endoscopy recommended. IMPRESSION:  · Intractable nausea and vomiting  · Epigastric pain  · COVID-19  · Type 1 diabetes mellitus    RECOMMENDATIONS:    · Continue Zofran and Reglan as needed as ordered for nausea  · Continue protonix as ordered  · Continue diet as tolerated  · Monitor CBC and CMP daily  · Continue IV fluids per PCP  · EGD to be done as outpatient  · Supportive Care  · Continue to monitor    Note: This report was completed utilizing computer voice recognition software. Every effort has been made to ensure accuracy, however; inadvertent computerized transcription errors may be present. Thank you very much for your consultation. We will follow closely with you. Discussed with Dr. Anatoliy Story developed by Dr. Leah Sierra, APRN, NP-C 9/28/2021 11:50 AM for Dr. Sharon Hendricks. NAUSEA AND VOMITING IMPROVED WITH CURRENT TREATMENT OF REGLAN, ZOFRAN AND [address] (DIABETIC) WILL ADD  DIFLUCAN EMPIRICALLY UNTIL COVID STATUS IMPROVES. ENDOSCOPIC WORK UP TO FOLLOW.      Naseem Perez MD 9/28/2021 2:16 PM

## 2021-09-28 NOTE — PROGRESS NOTES
Pharmacy Documentation     Medication: Baricitinib     Date: 9/28/21  Physician: Dr Angie Maldonado consulted to initiate Baricitinib. Patient does not currently meet [facility] P&T approved Baricitinib Criteria for Use to initiate medication. Patient not on supplemental oxygen. Pharmacy will sign off. Please re-consult if the clinical condition changes and patient meets criteria for initiation based on [facility] P&T approved Baricitinib Criteria for Use.      Thank you for this consult,  [facility]

## 2021-09-28 NOTE — ED NOTES
Patient sleeping, respirations easy, regular without distress. No further vomiting noted.       Gabby Solano RN  09/28/21 0429

## 2021-09-28 NOTE — H&P
Internal Medicine History & Physical     Name: Digna Christiansen  : 1995  Chief Complaint: Emesis (COVID positive vomiting for 5 days, unable to keep anything down)  Primary Care Physician: FLAVIO Rodriguez CNP  Admission date: 2021  Date of service: 2021     History of Present Illness  Mellisa Barrientos is a 23y.o. year old male. He has a past medical history of type 1 diabetes. He has been feeling sick since last , he had been going to work Monday and Tuesday however Wednesday symptoms became much worse. His mom was at bedside in the room and he was brought in on Friday for his symptoms that were manageable at the time. He continued to get worse over the weekend and has not been eating or drinking anything substantial.  His vomiting nausea have not been improved by anything, eating makes it worse, associated symptoms are fatigue and fever. She says that he is not the best when it comes to taking his insulin but he does a decent job of managing it. She also notes that at home he has had a fever of up to 103 °F and has been coughing so much that it is forcing him to gag and vomit up mucus and bile. He lives with his fiancé and his daughter and his daughter had been sick. He has been in DKA before but that was about 6 years ago. Labs in the emergency department showed an elevated procalcitonin, elevated CRP, anion gap was initially 27 and decreased to 21 after fluids were given. Beta hydroxybutyrate was within normal limits and there is not a urinalysis. CT pulmonary showed no evidence of pulmonary embolism with multifocal ill-defined mixed airspace and interstitial opacities in bilateral lungs, concerning for viral pneumonia. The history was provided by his mother, patient is capable of being his own historian although he was more comfortable resting. Reglan has been helping his nausea. ED course:   Initial blood work and imaging studies performed.  Admission recommended by ED physician. Case discussed with ED provider. Meds in ED consisted of the following: IVF hydration. Tylenol. Toradol. Reglan. Zofran. Phenergan. Past Medical History:   Diagnosis Date    Diabetes mellitus ([address]     Lactic acid increased 6/12/2016       Past Surgical History:   Procedure Laterality Date    APPENDECTOMY         Family History   Problem Relation Age of Onset    Cancer Neg Hx     Heart Disease Neg Hx     Stroke Neg Hx        Social History  Patient lives at home with his [de-identified] and daughter. Employment: yes  Illicit drug use- denies  TOBACCO:   reports that he has never smoked. His smokeless tobacco use includes chew. ETOH:   reports no history of alcohol use. Home Medications  Prior to Admission medications    Medication Sig Start Date End Date Taking? Authorizing Provider   insulin lispro, 1 Unit Dial, (HUMALOG KWIKPEN) 100 UNIT/ML SOPN Inject into the skin 3 times daily Sliding scale with meals: 1 unit per 10gm of carbohydrates   Yes Historical Provider, MD   ibuprofen (ADVIL;MOTRIN) 800 MG tablet Take 1 tablet by mouth every 8 hours as needed for Pain 9/24/21  Yes Cindy Villalpando DO   ondansetron (ZOFRAN ODT) 4 MG disintegrating tablet Take 1 tablet by mouth every 8 hours as needed for Nausea or Vomiting 9/24/21  Yes Cindy Villalpando DO   guaiFENesin-codeine (TUSSI-ORGANIDIN NR) 100-10 MG/5ML syrup Take 5 mLs by mouth 3 times daily as needed for Cough for up to 7 days.  9/24/21 10/1/21 Yes Cindy Villalpando DO   doxycycline hyclate (VIBRA-TABS) 100 MG tablet Take 1 tablet by mouth 2 times daily for 10 days 9/24/21 10/4/21 Yes Cindy Villalpando DO   Insulin Degludec (TRESIBA) 100 UNIT/ML SOLN Inject 60 Units into the skin nightly    Yes Historical Provider, MD   albuterol sulfate HFA (VENTOLIN HFA) 108 (90 Base) MCG/ACT inhaler Inhale 2 puffs into the lungs every 4 hours as needed for Shortness of Breath (cough) 9/24/21   Cindy Villalpando DO       Allergies  Allergies   Allergen Reactions    Niacin And Related Rash       Review of Systems  Please see HPI above. All bolded are positive. All un-bolded are negative. Constitutional Symptoms: fever, chills, fatigue, generalized weakness, diaphoresis, increase in thirst, loss of appetite  Eyes: vision change   Ears, Nose, Mouth, Throat: hearing loss, nasal congestion, sores in the mouth  Cardiovascular: chest pain, chest heaviness, palpitations  Respiratory: shortness of breath, wheezing, coughing  Gastrointestinal: abdominal pain, nausea, vomiting, diarrhea, constipation, melena, hematochezia, hematemesis  Genitourinary: dysuria, hematuria, increased frequency  Musculoskeletal: lower extremity edema, myalgias, arthralgias, back pain  Integumentary: rashes, itching   Neurological: headache, lightheadedness, dizziness, confusion, syncope, numbness, tingling, focal weakness  Psychiatric: depression, suicidal ideation, anxiety  Endocrine: unintentional weight change  Hematologic/Lymphatic: lymphadenopathy, easy bruising, easy bleeding   Allergic/Immunologic: recurrent infections      Objective  VITALS:  BP (!) 138/90   Pulse 102   Temp 99.1 °F (37.3 °C) (Oral)   Resp 20   Ht 6' (1.829 m)   Wt 200 lb (90.7 kg)   SpO2 95%   BMI 27.12 kg/m²     Physical Exam:  General: awake, alert, oriented to person, place, time, and purpose, appears stated age, cooperative, no acute distress, pleasant, appropriate mood, he did not want to talk  Eyes: conjunctivae/corneas clear, sclera non icteric, EOMI  Ears: no obvious scars, no lesions, no masses, hearing intact  Mouth: mucous membranes dry, no obvious oral sores  Head: normocephalic, atraumatic  Neck: no JVD, no adenopathy, no thyromegaly, neck is supple, trachea is midline  Back: ROM normal, no CVA tenderness.   Chest: no pain on palpation  Lungs: clear to auscultation bilaterally, without rhonchi, crackle, wheezing, or rale, no retractions or use of accessory muscles  Heart: Tachycardic rate and regular rhythm, no murmur, normal S1, S2  Abdomen: soft, diffuse abdominal pain; bowel sounds normal; no masses, no organomegaly  : Deferred   Extremities: no lower extremity edema, extremities atraumatic, no cyanosis, no clubbing, 2+ pedal pulses palpated  Skin: normal color, normal texture, normal turgor, no rashes, no lesions  Neurologic:5/5 muscle strength throughout, normal muscle tone throughout, face symmetric, hearing intact, tongue midline, speech appropriate without slurring, sensation to fine touch intact in upper and lower extremities    Labs-   Lab Results   Component Value Date    WBC 7.4 09/28/2021    HGB 14.0 09/28/2021    HCT 42.2 09/28/2021     (L) 09/28/2021     09/28/2021    K 4.7 09/28/2021     09/28/2021    CREATININE 1.0 09/28/2021    BUN 14 09/28/2021    CO2 15 (L) 09/28/2021    GLUCOSE 284 (H) 09/28/2021    ALT 17 09/28/2021    AST 38 09/28/2021    INR 1.1 09/28/2021     Lab Results   Component Value Date    CKTOTAL 61 09/24/2021       Recent Radiological Studies:  CT CHEST PULMONARY EMBOLISM W CONTRAST   Final Result   1. No evidence of pulmonary embolism. No evidence of right heart strain. 2.  Multifocal ill-defined mixed airspace/interstitial opacities in the   bilateral lungs. Imaging features concerning for infection. Commonly reported imaging features of COVID-19 pneumonia are present. Other   processes such as influenza pneumonia and organizing pneumonia, as can be   seen with drug toxicity and connective tissue disease, can cause a similar   imaging pattern. 3.  There is suggestion of diffuse thickening of the mid to distal esophagus. Please see recommendations below. RECOMMENDATIONS:   Recommend follow-up chest CT 3-6 months after resolution of patient's   symptoms to reassess lung parenchymal changes. After patient recovers from current medical issues, recommend GI   consultation.   Upper GI esophagram versus upper GI endoscopy recommended. Assessment  Active Hospital Problems    Diagnosis     Severe dehydration [E86.0]      Priority: Medium    COVID-19 [U07.1]      Priority: Medium    Intractable nausea and vomiting [R11.2]     Uncontrolled type 1 diabetes mellitus ([facility] [E10.65]        Patient Active Problem List    Diagnosis Date Noted    Severe dehydration 09/28/2021     Priority: Medium    COVID-19 09/28/2021     Priority: Medium    Intractable nausea and vomiting 09/28/2021    Uncontrolled type 1 diabetes mellitus ([facility] 12/13/2014       Plan  · Intractable nausea and vomiting with severe dehydration-likely related to COVID-19 or possibly doxycycline: IV Zofran or IV Reglan as needed. IV fluid hydration. · Esophageal thickening: GI consultation for EGD-possibly could be done as an outpatient. PPI. · Type 1 DM, controlled: Continue home DM meds-- adjust as needed. SSI. HbA1c. Consider endocrinology consult. COVID-19 viral pneumonia w/o hypoxia  Contact/droplet isolation. Decadron-modified dose due to diabetes. Baricitinib  BID Lovenox. Follow inflammatory markers. Tylenol prn fevers or myalgias. Cough meds. Pharmacy consultation for baricitinib. · Elevated procalcitonin: IV Levaquin. Discontinue doxycycline. · Low vitamin D: Replace. Needs followed as an outpatient in 6 weeks  · Continue home medications  · Follow labs  · DVT prophylaxis. · Please see orders for further management and care. ·  for discharge planning  · Discharge plan: TBD pending clinical improvement     Miguel Angel Perez DO  9/28/2021  11:09 AM    I can be reached through [employer]. NOTE:  This report was transcribed using voice recognition software. Every effort was made to ensure accuracy; however, inadvertent computerized transcription errors may be present. Addendum: I have personally participated in a face-to-face history and physical exam on the date of service with the patient.  I have discussed the case with the resident. I also participated in medical decision making with the resident on the date of service and I agree with all of the pertinent clinical information unless indicated in my editing of the note. I have reviewed and edited the note above based on my findings during my history, exam, and decision making on the same day of service. Electronically signed by Lisa Viera DO on 9/28/2021 at 12:36 PM    I can be reached through [facility].

## 2021-09-28 NOTE — ED PROVIDER NOTES
HPI:  9/28/21,   Time: 2:47 AM EDT        Digna Christiansen is a 23 y.o. male presenting to the ED for nausea and vomiting. Patient Covid positive, symptoms began on Tuesday, patient began having nausea and vomiting approximately 4 to 5 days ago, tested positive for Covid at that time. Patient was seen at this facility several days ago, patient was discharged home with antiemetics, Zofran, however symptoms have been uncontrolled with Zofran at home. Patient has continued to have nausea and vomiting since his previous visit. Has not been able to tolerate any food or liquids. Patient is a type 1 insulin-dependent diabetic. Patient admits to epigastric abdominal pain radiating into his chest.  Patient continues to have cough. No significant shortness of breath. No other complaints. ROS:   GEN: no fevers, no chills, no fatique  HENT: No trauma, no sore throat, no swelling throat or oral mucosa  EYES: No changes in vision, no pain  CARDIO: See HPI  PULM: No trouble breathing, no wheezing  ABD: See HPI  MSK: No pain, no trauma, no deformities  SKIN: No rashes, no abrasions, no lacerations  NEURO: No changes in mentation, no headache, no weakness     --------------------------------------------- PAST HISTORY ---------------------------------------------  Past Medical History:  has a past medical history of [facility] and Lactic acid increased. Past Surgical History:  has a past surgical history that includes Appendectomy. Social History:  reports that he has never smoked. His smokeless tobacco use includes chew. He reports that he does not drink alcohol. Family History: family history is not on file. The patients home medications have been reviewed.     Allergies: Niacin and related    -------------------------------------------------- RESULTS -------------------------------------------------  All laboratory and radiology results have been personally reviewed by myself LABS:  Results for orders placed or performed during the hospital encounter of 09/27/21   CBC Auto Differential   Result Value Ref Range    WBC 9.3 4.5 - 11.5 E9/L    RBC 5.72 3.80 - 5.80 E12/L    Hemoglobin 17.5 (H) 12.5 - 16.5 g/dL    Hematocrit 51.2 37.0 - 54.0 %    MCV 89.5 80.0 - 99.9 fL    MCH 30.6 26.0 - 35.0 pg    MCHC 34.2 32.0 - 34.5 %    RDW 12.4 11.5 - 15.0 fL    Platelets 619 353 - 006 E9/L    MPV 12.9 (H) 7.0 - 12.0 fL    Neutrophils % 85.7 (H) 43.0 - 80.0 %    Immature Granulocytes % 0.2 0.0 - 5.0 %    Lymphocytes % 10.2 (L) 20.0 - 42.0 %    Monocytes % 3.7 2.0 - 12.0 %    Eosinophils % 0.0 0.0 - 6.0 %    Basophils % 0.2 0.0 - 2.0 %    Neutrophils Absolute 7.94 (H) 1.80 - 7.30 E9/L    Immature Granulocytes # 0.02 E9/L    Lymphocytes Absolute 0.95 (L) 1.50 - 4.00 E9/L    Monocytes Absolute 0.34 0.10 - 0.95 E9/L    Eosinophils Absolute 0.00 (L) 0.05 - 0.50 E9/L    Basophils Absolute 0.02 0.00 - 0.20 E9/L   Comprehensive Metabolic Panel w/ Reflex to MG   Result Value Ref Range    Sodium 136 132 - 146 mmol/L    Potassium reflex Magnesium 4.0 3.5 - 5.0 mmol/L    Chloride 95 (L) 98 - 107 mmol/L    CO2 14 (L) 22 - 29 mmol/L    Anion Gap 27 (H) 7 - 16 mmol/L    Glucose 191 (H) 74 - 99 mg/dL    BUN 15 6 - 20 mg/dL    CREATININE 1.0 0.7 - 1.2 mg/dL    GFR Non-African American >60 >=60 mL/min/1.73    GFR African American >60     Calcium 9.7 8.6 - 10.2 mg/dL    Total Protein 8.0 6.4 - 8.3 g/dL    Albumin 4.2 3.5 - 5.2 g/dL    Total Bilirubin 0.3 0.0 - 1.2 mg/dL    Alkaline Phosphatase 80 40 - 129 U/L    ALT 22 0 - 40 U/L    AST 27 0 - 39 U/L   pH, venous   Result Value Ref Range    pH, [place] 7.30 (L) 7.35 - 7.45   Lactic Acid, Plasma   Result Value Ref Range    Lactic Acid 2.9 (H) 0.5 - 2.2 mmol/L   D-Dimer, Quantitative   Result Value Ref Range    D-Dimer, Quant 391 ng/mL DDU   Beta-Hydroxybutyrate   Result Value Ref Range    Beta-Hydroxybutyrate 0.05 0.02 - 0.27 mmol/L       RADIOLOGY:  Interpreted by Radiologist.  CT CHEST PULMONARY EMBOLISM W CONTRAST   Final Result   1. No evidence of pulmonary embolism. No evidence of right heart strain. 2.  Multifocal ill-defined mixed airspace/interstitial opacities in the   bilateral lungs. Imaging features concerning for infection. Commonly reported imaging features of COVID-19 pneumonia are present. Other   processes such as influenza pneumonia and organizing pneumonia, as can be   seen with drug toxicity and connective tissue disease, can cause a similar   imaging pattern. 3.  There is suggestion of diffuse thickening of the mid to distal esophagus. Please see recommendations below. RECOMMENDATIONS:   Recommend follow-up chest CT 3-6 months after resolution of patient's   symptoms to reassess lung parenchymal changes. After patient recovers from current medical issues, recommend GI   consultation. Upper GI esophagram versus upper GI endoscopy recommended. ------------------------- NURSING NOTES AND VITALS REVIEWED ---------------------------   The nursing notes within the ED encounter and vital signs as below have been reviewed. /76   Pulse 93   Temp 99.8 °F (37.7 °C) (Oral)   Resp 18   Ht 6' (1.829 m)   Wt 200 lb (90.7 kg)   SpO2 97%   BMI 27.12 kg/m²   Oxygen Saturation Interpretation: Normal    ---------------------------------------------------PHYSICAL EXAM--------------------------------------    GEN: No acute distress, uncomfortable appearing, well nourished   HENT: Normocephalic, atraumatic, oral mucosa dry  EYES: No scleral injection, no scleral icterus, PERRL   PULM: Lungs clear to ascultation bilaterally, no wheezes, no crackles  CARDIO: Regular rate, regular rhythm, normal S1/S2  ABD: Soft, no rigidity, no guarding, no distention.  + Tenderness to palpation epigastric region.   MSK: No deformities, no edema, palpable pulses all extremities   SKIN: No rashes, no lacerations, no abrasions   NEURO: Awake, alert, appropriate         ------------------------------ ED COURSE/MEDICAL DECISION MAKING----------------------  Medications   0.9 % sodium chloride infusion (1,000 mLs IntraVENous New Bag 9/27/21 2253)   promethazine (PHENERGAN) injection 25 mg (has no administration in time range)   0.9 % sodium chloride infusion (has no administration in time range)   ondansetron (ZOFRAN) injection 4 mg (has no administration in time range)   0.9 % sodium chloride bolus (0 mLs IntraVENous Stopped 9/27/21 2022)   ketorolac (TORADOL) injection 15 mg (15 mg IntraVENous Given 9/27/21 1933)   promethazine (PHENERGAN) injection 25 mg (25 mg IntraMUSCular Given 9/27/21 1929)   0.9 % sodium chloride bolus (0 mLs IntraVENous Stopped 9/27/21 2128)   acetaminophen (TYLENOL) tablet 1,000 mg (1,000 mg Oral Given 9/27/21 2026)   iopamidol (ISOVUE-370) 76 % injection 75 mL (75 mLs IntraVENous Given 9/27/21 2043)   ondansetron (ZOFRAN) injection 4 mg (4 mg IntraVENous Given 9/27/21 2253)         ED Course and Medical Decision Making:   Patient presents for nausea and vomiting secondary to Covid. Patient in no respiratory distress. Patient did have anion gap metabolic acidosis, lactic acid mildly elevated, beta hydroxybutyrate within normal limits therefore doubt DKA. Patient was rehydrated with IV fluids, attempted to control symptoms with multiple doses of Phenergan and Zofran, however patient continued to have nausea and vomiting and was unable to tolerate liquids or food by mouth. Given patient's intractable nausea and vomiting and electrolyte and work-up abnormalities, hospital mission recommended for ongoing evaluation management and treatment of patient's symptoms, patient is agreeable to plan. Remainder work-up relatively unremarkable, D-dimer mildly elevated but CT did not reveal pulmonary embolism.   Patient hemodynamically stable throughout ED stay    Case discussed with admitting physician, accepted for admission at this time.       --------------------------------- ADDITIONAL PROVIDER NOTES ---------------------------------    This patient's ED course included: re-evaluation prior to disposition and a personal history and physicial eaxmination    This patient has remained hemodynamically stable during their ED course. Counseling: The emergency provider has spoken with the patient and discussed todays results, in addition to providing specific details for the plan of care and counseling regarding the diagnosis and prognosis. Questions are answered at this time and they are agreeable with the plan.      --------------------------------- IMPRESSION AND DISPOSITION ---------------------------------    IMPRESSION  1. Intractable nausea and vomiting    2. Metabolic acidosis    3. COVID-19    4.  Type [facility]        DISPOSITION  Disposition: Admit to med/surg floor  Patient condition is [address] [place] [address], DO  09/28/21 1401

## 2021-09-28 NOTE — PROGRESS NOTES
Pt is ready for admission to floor at this time. He will be taken to floor with ED Tech. Pt updated on POC, family updated on admission. VSS. Ready for admission at this time. Taken to floor with all belongings. SBAR sheet faxed to floor, fax receipt received back to floor.

## 2021-09-28 NOTE — PROGRESS NOTES
Notified resident of Dr. Darian Calderon regarding mother's request for reglan instead of zofran po and iv. Stated will make rounds soon.

## 2021-09-28 NOTE — ED NOTES
Patient resting quietly with eyes closed. Reports improvement in symptoms.       Colette Cruz RN  09/27/21 2400

## 2021-09-29 VITALS
OXYGEN SATURATION: 97 % | DIASTOLIC BLOOD PRESSURE: 102 MMHG | RESPIRATION RATE: 16 BRPM | HEART RATE: 84 BPM | SYSTOLIC BLOOD PRESSURE: 146 MMHG | BODY MASS INDEX: 27.09 KG/M2 | WEIGHT: 200 LBS | TEMPERATURE: 98.2 F | HEIGHT: 72 IN

## 2021-09-29 LAB
ALBUMIN SERPL-MCNC: 3.3 G/DL (ref 3.5–5.2)
ALP BLD-CCNC: 64 U/L (ref 40–129)
ALT SERPL-CCNC: 16 U/L (ref 0–40)
ANION GAP SERPL CALCULATED.3IONS-SCNC: 22 MMOL/L (ref 7–16)
APTT: 26.9 SEC (ref 24.5–35.1)
AST SERPL-CCNC: 24 U/L (ref 0–39)
ATYPICAL LYMPHOCYTE RELATIVE PERCENT: 1.7 % (ref 0–4)
BASOPHILS ABSOLUTE: 0 E9/L (ref 0–0.2)
BASOPHILS RELATIVE PERCENT: 0 % (ref 0–2)
BILIRUB SERPL-MCNC: 0.3 MG/DL (ref 0–1.2)
BLOOD CULTURE, ROUTINE: NORMAL
BUN BLDV-MCNC: 10 MG/DL (ref 6–20)
C-REACTIVE PROTEIN: 10.8 MG/DL (ref 0–0.4)
CALCIUM SERPL-MCNC: 9 MG/DL (ref 8.6–10.2)
CHLORIDE BLD-SCNC: 105 MMOL/L (ref 98–107)
CO2: 12 MMOL/L (ref 22–29)
CREAT SERPL-MCNC: 0.7 MG/DL (ref 0.7–1.2)
CULTURE, BLOOD 2: NORMAL
D DIMER: 980 NG/ML DDU
EOSINOPHILS ABSOLUTE: 0 E9/L (ref 0.05–0.5)
EOSINOPHILS RELATIVE PERCENT: 0 % (ref 0–6)
FIBRINOGEN: >700 MG/DL (ref 225–540)
GFR AFRICAN AMERICAN: >60
GFR NON-AFRICAN AMERICAN: >60 ML/MIN/1.73
GLUCOSE BLD-MCNC: 215 MG/DL (ref 74–99)
HBA1C MFR BLD: 9.3 % (ref 4–5.6)
HCT VFR BLD CALC: 43.6 % (ref 37–54)
HEMOGLOBIN: 15.2 G/DL (ref 12.5–16.5)
INR BLD: 1.1
LACTIC ACID: 1 MMOL/L (ref 0.5–2.2)
LYMPHOCYTES ABSOLUTE: 1.43 E9/L (ref 1.5–4)
LYMPHOCYTES RELATIVE PERCENT: 17.4 % (ref 20–42)
MCH RBC QN AUTO: 31 PG (ref 26–35)
MCHC RBC AUTO-ENTMCNC: 34.9 % (ref 32–34.5)
MCV RBC AUTO: 88.8 FL (ref 80–99.9)
METER GLUCOSE: 170 MG/DL (ref 74–99)
METER GLUCOSE: 204 MG/DL (ref 74–99)
MONOCYTES ABSOLUTE: 0.22 E9/L (ref 0.1–0.95)
MONOCYTES RELATIVE PERCENT: 2.6 % (ref 2–12)
NEUTROPHILS ABSOLUTE: 5.85 E9/L (ref 1.8–7.3)
NEUTROPHILS RELATIVE PERCENT: 78.3 % (ref 43–80)
NUCLEATED RED BLOOD CELLS: 0 /100 WBC
PDW BLD-RTO: 12.6 FL (ref 11.5–15)
PLATELET # BLD: 139 E9/L (ref 130–450)
PMV BLD AUTO: 12.5 FL (ref 7–12)
POTASSIUM REFLEX MAGNESIUM: 4 MMOL/L (ref 3.5–5)
PROTHROMBIN TIME: 11.9 SEC (ref 9.3–12.4)
RBC # BLD: 4.91 E12/L (ref 3.8–5.8)
RBC # BLD: NORMAL 10*6/UL
SODIUM BLD-SCNC: 139 MMOL/L (ref 132–146)
TOTAL PROTEIN: 6.8 G/DL (ref 6.4–8.3)
WBC # BLD: 7.5 E9/L (ref 4.5–11.5)

## 2021-09-29 PROCEDURE — 6370000000 HC RX 637 (ALT 250 FOR IP): Performed by: NURSE PRACTITIONER

## 2021-09-29 PROCEDURE — 36415 COLL VENOUS BLD VENIPUNCTURE: CPT

## 2021-09-29 PROCEDURE — 85610 PROTHROMBIN TIME: CPT

## 2021-09-29 PROCEDURE — 86140 C-REACTIVE PROTEIN: CPT

## 2021-09-29 PROCEDURE — 82962 GLUCOSE BLOOD TEST: CPT

## 2021-09-29 PROCEDURE — 96376 TX/PRO/DX INJ SAME DRUG ADON: CPT

## 2021-09-29 PROCEDURE — G0378 HOSPITAL OBSERVATION PER HR: HCPCS

## 2021-09-29 PROCEDURE — 83036 HEMOGLOBIN GLYCOSYLATED A1C: CPT

## 2021-09-29 PROCEDURE — 6360000002 HC RX W HCPCS: Performed by: EMERGENCY MEDICINE

## 2021-09-29 PROCEDURE — 85025 COMPLETE CBC W/AUTO DIFF WBC: CPT

## 2021-09-29 PROCEDURE — 2580000003 HC RX 258: Performed by: INTERNAL MEDICINE

## 2021-09-29 PROCEDURE — 96372 THER/PROPH/DIAG INJ SC/IM: CPT

## 2021-09-29 PROCEDURE — 83605 ASSAY OF LACTIC ACID: CPT

## 2021-09-29 PROCEDURE — 85730 THROMBOPLASTIN TIME PARTIAL: CPT

## 2021-09-29 PROCEDURE — 85378 FIBRIN DEGRADE SEMIQUANT: CPT

## 2021-09-29 PROCEDURE — 6370000000 HC RX 637 (ALT 250 FOR IP): Performed by: INTERNAL MEDICINE

## 2021-09-29 PROCEDURE — 80053 COMPREHEN METABOLIC PANEL: CPT

## 2021-09-29 PROCEDURE — C9113 INJ PANTOPRAZOLE SODIUM, VIA: HCPCS | Performed by: INTERNAL MEDICINE

## 2021-09-29 PROCEDURE — 96366 THER/PROPH/DIAG IV INF ADDON: CPT

## 2021-09-29 PROCEDURE — 6360000002 HC RX W HCPCS: Performed by: INTERNAL MEDICINE

## 2021-09-29 PROCEDURE — 85384 FIBRINOGEN ACTIVITY: CPT

## 2021-09-29 RX ORDER — METOCLOPRAMIDE 5 MG/1
5 TABLET ORAL 3 TIMES DAILY PRN
Qty: 21 TABLET | Refills: 0 | Status: SHIPPED | OUTPATIENT
Start: 2021-09-29

## 2021-09-29 RX ORDER — PANTOPRAZOLE SODIUM 40 MG/1
40 TABLET, DELAYED RELEASE ORAL
Qty: 14 TABLET | Refills: 0 | Status: SHIPPED | OUTPATIENT
Start: 2021-09-29

## 2021-09-29 RX ORDER — LEVOFLOXACIN 750 MG/1
750 TABLET ORAL DAILY
Qty: 7 TABLET | Refills: 0 | Status: SHIPPED | OUTPATIENT
Start: 2021-09-29 | End: 2021-10-06

## 2021-09-29 RX ORDER — CHOLECALCIFEROL (VITAMIN D3) 50 MCG
2000 TABLET ORAL DAILY
Qty: 60 TABLET | Refills: 0 | Status: SHIPPED | OUTPATIENT
Start: 2021-09-29

## 2021-09-29 RX ORDER — FLUCONAZOLE 200 MG/1
200 TABLET ORAL DAILY
Qty: 7 TABLET | Refills: 0 | Status: SHIPPED | OUTPATIENT
Start: 2021-09-30 | End: 2021-10-07

## 2021-09-29 RX ADMIN — INSULIN LISPRO 4 UNITS: 100 INJECTION, SOLUTION INTRAVENOUS; SUBCUTANEOUS at 05:30

## 2021-09-29 RX ADMIN — ENOXAPARIN SODIUM 30 MG: 30 INJECTION, SOLUTION INTRAVENOUS; SUBCUTANEOUS at 09:00

## 2021-09-29 RX ADMIN — SODIUM CHLORIDE, PRESERVATIVE FREE 10 ML: 5 INJECTION INTRAVENOUS at 09:00

## 2021-09-29 RX ADMIN — Medication 2000 UNITS: at 09:00

## 2021-09-29 RX ADMIN — SODIUM CHLORIDE: 9 INJECTION, SOLUTION INTRAVENOUS at 03:48

## 2021-09-29 RX ADMIN — LEVOFLOXACIN 750 MG: 5 INJECTION, SOLUTION INTRAVENOUS at 14:13

## 2021-09-29 RX ADMIN — PANTOPRAZOLE SODIUM 40 MG: 40 INJECTION, POWDER, FOR SOLUTION INTRAVENOUS at 09:00

## 2021-09-29 RX ADMIN — FLUCONAZOLE 200 MG: 100 TABLET ORAL at 09:00

## 2021-09-29 RX ADMIN — METOCLOPRAMIDE HYDROCHLORIDE 5 MG: 5 INJECTION INTRAMUSCULAR; INTRAVENOUS at 05:30

## 2021-09-29 RX ADMIN — INSULIN LISPRO 2 UNITS: 100 INJECTION, SOLUTION INTRAVENOUS; SUBCUTANEOUS at 11:22

## 2021-09-29 ASSESSMENT — PAIN SCALES - GENERAL: PAINLEVEL_OUTOF10: 0

## 2021-09-29 NOTE — PLAN OF CARE
Problem: Airway Clearance - Ineffective  Goal: Achieve or maintain patent airway  Outcome: Ongoing     Problem: Gas Exchange - Impaired  Goal: Absence of hypoxia  Outcome: Ongoing     Problem:  Body Temperature -  Risk of, Imbalanced  Goal: Ability to maintain a body temperature within defined limits  Outcome: Ongoing  Goal: Complications related to the disease process, condition or treatment will be avoided or minimized  Outcome: Ongoing

## 2021-09-29 NOTE — PROGRESS NOTES
Message sent to Dr. Ida Avina re: discharge, awaiting response. Responded-waiting on CRP-called lab -specimen sent to [facility] via , called [facility]-still haven't received specimen yet. Mother called and requesting time of discharge, second message sent to Dr. Ida Avina on how to proceed.      Electronically signed by Sheba Camacho RN on 9/29/2021 at 4:13 PM

## 2021-09-29 NOTE — ACP (ADVANCE CARE PLANNING)
Advance Care Planning     Advance Care Planning Activator (Inpatient)  Conversation Note      Date of ACP Conversation: 9/29/2021     Conversation Conducted with: Gracie Choudhary    ACP Activator: Dennise Ramires RN    Health Care Decision Maker:     Current Designated Health Care Decision Maker:     Primary Decision Maker: Luis Farris - Parent - [phone]    Secondary Decision Maker: Isabel Birmingham - Parent - [phone]  Click here to complete Healthcare Decision Makers including section of the Healthcare Decision Maker Relationship (ie \"Primary\")    Care Preferences    Ventilation: \"If you were in your present state of health and suddenly became very ill and were unable to breathe on your own, what would your preference be about the use of a ventilator (breathing machine) if it were available to you? \"      Would the patient desire the use of ventilator (breathing machine)?: yes    \"If your health worsens and it becomes clear that your chance of recovery is unlikely, what would your preference be about the use of a ventilator (breathing machine) if it were available to you? \"     Would the patient desire the use of ventilator (breathing machine)?: yes      Resuscitation  \"CPR works best to restart the heart when there is a sudden event, like a heart attack, in someone who is otherwise healthy. Unfortunately, CPR does not typically restart the heart for people who have serious health conditions or who are very sick. \"    \"In the event your heart stopped as a result of an underlying serious health condition, would you want attempts to be made to restart your heart (answer \"yes\" for attempt to resuscitate) or would you prefer a natural death (answer \"no\" for do not attempt to resuscitate)? \" yes       [] Yes   [x] No   Educated Patient / Lucita List regarding differences between Advance Directives and portable DNR orders.     Length of ACP Conversation in minutes:  10 minutes    Conversation Outcomes:  [x] ACP discussion completed  [] Existing advance directive reviewed with patient; no changes to patient's previously recorded wishes  [] New Advance Directive completed  [] Portable Do Not Rescitate prepared for Provider review and signature  [] POLST/POST/MOLST/MOST prepared for Provider review and signature      Follow-up plan:    [] Schedule follow-up conversation to continue planning  [x] Referred individual to Provider for additional questions/concerns   [] Advised patient/agent/surrogate to review completed ACP document and update if needed with changes in condition, patient preferences or care setting    [] This note routed to one or more involved healthcare providers

## 2021-09-29 NOTE — PROGRESS NOTES
Comprehensive Nutrition Assessment    Type and Reason for Visit:  Initial, Positive Nutrition Screen    Nutrition Recommendations/Plan: Continue current diet. Nutrition Assessment:  PMHx of Type DM. He has been feeling sick since last Sunday    Malnutrition Assessment:  Malnutrition Status:  Insufficient data    Context:  Acute Illness     Findings of the 6 clinical characteristics of malnutrition:  Energy Intake:  Mild decrease in energy intake (Comment) (Pt stated poor appetitie and PO ~4-5days prior to adm)  Weight Loss:  Unable to assess     Body Fat Loss:  Unable to assess (COVID isolation)     Muscle Mass Loss:  Unable to assess (COVID isolation)    Fluid Accumulation:  Unable to assess (COVID isolation)     Strength:  Not Performed    Estimated Daily Nutrient Needs:  Energy (kcal):   (20-25kcal/kg CBW); Weight Used for Energy Requirements:  Current     Protein (g):  105-120g (1.3-1.5g/kg IBW); Weight Used for Protein Requirements:  Ideal        Fluid (ml/day):  ; Method Used for Fluid Requirements:  1 ml/kcal      Nutrition Related Findings:  A&Ox4, BS active, nausea, Abd WDL, +I/Os, no Edema      Wounds:  None       Current Nutrition Therapies:    ADULT DIET; Regular; 4 carb choices (60 gm/meal)    Anthropometric Measures:  · Height: 6' (182.9 cm)  · Current Body Weight: 200 lb (90.7 kg) (9/27)     · Usual Body Weight:  (UTO per EMR)     · Ideal Body Weight: 178 lbs; % Ideal Body Weight 112.4 %   · BMI: 27.1  · Adjusted Body Weight:  ; No Adjustment       · BMI Categories: Overweight (BMI 25.0-29. 9)       Nutrition Diagnosis:   · Inadequate oral intake related to impaired respiratory function (COVID pneumonia) as evidenced by intake 26-50%, poor intake prior to admission, GI abnormality, nausea    Nutrition Interventions:   Food and/or Nutrient Delivery:  Continue Current Diet  Nutrition Education/Counseling:  No recommendation at this time (Pt declined any ONS)   Coordination of Nutrition Care:  Continue to monitor while inpatient    Goals:  >75% of meals and ONS consumed       Nutrition Monitoring and Evaluation:   Behavioral-Environmental Outcomes:  None Identified   Food/Nutrient Intake Outcomes:  Food and Nutrient Intake  Physical Signs/Symptoms Outcomes:  Biochemical Data, GI Status, Nausea or Vomiting, Fluid Status or Edema, Skin, Weight, Nutrition Focused Physical Findings     Discharge Planning:     Too soon to determine     Electronically signed by Emelia He RD, HECTOR on 9/29/21 at 10:39 AM EDT    Contact: [phone]

## 2021-09-29 NOTE — PROGRESS NOTES
PROGRESS NOTE    Patient Presents with/Seen in Consultation For       *Intractable nausea and vomiting  CHIEF COMPLAINT: nausea and vomiting    Subjective:     Teleservice done in [facility] COVID-19 isolation precautions    Patient states he was able to eat a little bit of his breakfast this morning. Denies abdominal pain, nausea or vomiting. Bowel movement last night described as loose and brown. Plan of care discussed with patient. Review of Systems  Aside from what was mentioned in the PMH and HPI, essentially unremarkable, all others negative.     Objective:     BP (!) 143/89   Pulse 94   Temp 98.6 °F (37 °C) (Oral)   Resp 16   Ht 6' (1.829 m)   Wt 200 lb (90.7 kg)   SpO2 97%   BMI 27.12 kg/m²     Televisit done in lieu of Covid 19 isolation precautions    0.9 % sodium chloride infusion, Continuous  acetaminophen (TYLENOL) tablet 650 mg, Q6H PRN   Or  acetaminophen (TYLENOL) suppository 650 mg, Q6H PRN  guaiFENesin-dextromethorphan (ROBITUSSIN DM) 100-10 MG/5ML syrup 5 mL, Q4H PRN  Vitamin D (CHOLECALCIFEROL) tablet 2,000 Units, Daily  sodium chloride flush 0.9 % injection 10 mL, 2 times per day  sodium chloride flush 0.9 % injection 10 mL, PRN  0.9 % sodium chloride infusion, PRN  potassium chloride (KLOR-CON M) extended release tablet 40 mEq, PRN   Or  potassium bicarb-citric acid (EFFER-K) effervescent tablet 40 mEq, PRN   Or  potassium chloride 10 mEq/100 mL IVPB (Peripheral Line), PRN  enoxaparin (LOVENOX) injection 30 mg, BID  ondansetron (ZOFRAN-ODT) disintegrating tablet 4 mg, Q8H PRN   Or  ondansetron (ZOFRAN) injection 4 mg, Q6H PRN  senna (SENOKOT) tablet 8.6 mg, Daily PRN  insulin lispro (HUMALOG) injection vial 0-12 Units, TID WC  insulin lispro (HUMALOG) injection vial 0-6 Units, Nightly  glucose (GLUTOSE) 40 % oral gel 15 g, PRN  dextrose 50 % IV solution, PRN  glucagon (rDNA) injection 1 mg, PRN  dextrose 5 % solution, PRN  albuterol sulfate  (90 Base) MCG/ACT inhaler 2 puff, Q4H PRN  insulin glargine (LANTUS) injection vial 48 Units, Nightly  pantoprazole (PROTONIX) injection 40 mg, BID  metoclopramide (REGLAN) injection 5 mg, Q6H PRN  levoFLOXacin (LEVAQUIN) 750 MG/150ML infusion 750 mg, Q24H  fluconazole (DIFLUCAN) tablet 200 mg, Daily         Data Review  CBC:   Lab Results   Component Value Date    WBC 7.4 09/28/2021    RBC 4.60 09/28/2021    HGB 14.0 09/28/2021    HCT 42.2 09/28/2021    MCV 91.7 09/28/2021    MCH 30.4 09/28/2021    MCHC 33.2 09/28/2021    RDW 12.5 09/28/2021     09/28/2021    MPV 12.7 09/28/2021     CMP:    Lab Results   Component Value Date     09/28/2021    K 4.7 09/28/2021     09/28/2021    CO2 15 09/28/2021    BUN 14 09/28/2021    CREATININE 1.0 09/28/2021    GFRAA >60 09/28/2021    LABGLOM >60 09/28/2021    GLUCOSE 284 09/28/2021    PROT 6.2 09/28/2021    LABALBU 3.2 09/28/2021    CALCIUM 8.5 09/28/2021    BILITOT 0.3 09/28/2021    ALKPHOS 59 09/28/2021    AST 38 09/28/2021    ALT 17 09/28/2021     Hepatic Function Panel:    Lab Results   Component Value Date    ALKPHOS 59 09/28/2021    ALT 17 09/28/2021    AST 38 09/28/2021    PROT 6.2 09/28/2021    BILITOT 0.3 09/28/2021    LABALBU 3.2 09/28/2021     No components found for: CHLPL  Lab Results   Component Value Date    TRIG 275 (H) 06/04/2021    TRIG 247 (H) 12/04/2020    TRIG 327 (H) 06/02/2020     Lab Results   Component Value Date    HDL 33 06/04/2021    HDL 36 12/04/2020    HDL 29 06/02/2020     Lab Results   Component Value Date    LDLCALC 135 (H) 06/04/2021    LDLCALC 140 (H) 12/04/2020    LDLCALC 136 (H) 06/02/2020     Lab Results   Component Value Date    LABVLDL 55 06/04/2021    LABVLDL 49 12/04/2020    LABVLDL 65 06/02/2020      PT/INR:    Lab Results   Component Value Date    PROTIME 12.2 09/28/2021    INR 1.1 09/28/2021     FERRITIN:    Lab Results   Component Value Date    FERRITIN 1,007 09/28/2021         Assessment:   Active problems:  ? Intractable nausea and vomiting- resolved  ? Epigastric pain- resolved  ? COVID-19  ? Type 1 diabetes mellitus    Plan:   ? Continue diflucan as ordered  ? Continue Zofran and Reglan as needed as ordered for nausea  ? Continue protonix as ordered  ? Continue diet as tolerated  ? Monitor CBC and CMP daily  ? Continue IV fluids per PCP  ? EGD to be done as outpatient  ? Supportive Care  ? Continue to monitor      Note: This report was completed utilizing computer voice recognition software. Every effort has been made to ensure accuracy, however; inadvertent computerized transcription errors may be present.      Discussed with Dr. Marcellus John per Dr. Junie Cuevas, APRN-NP-C 9/29/2021  8:48 AM For Dr. Aaron Forbes

## 2021-09-29 NOTE — DISCHARGE SUMMARY
Internal Medicine Discharge Summary    NAME: Edenilson Renteria :  1995  MRN:  [id number] FLAVIO Luis CNP    ADMITTED: 2021   DISCHARGED: 2021  6:53 PM    ADMITTING PHYSICIAN: Juan Manuel Jaeger DO    PCP: FLAVIO Youssef CNP    CONSULTANT(S):   IP CONSULT TO PHARMACY  IP CONSULT TO GI     ADMITTING DIAGNOSIS:   Metabolic acidosis [U75.5]  Type 1 diabetes mellitus without complication (HCC) [Y95.9]  Intractable nausea and vomiting [R11.2]  COVID-19 [U07.1]     Please see H&P for further details    DISCHARGE DIAGNOSES:   Active Hospital Problems    Diagnosis     Intractable nausea and vomiting [R11.2]      Priority: High    Severe dehydration [E86.0]      Priority: Medium    COVID-19 [U07.1]      Priority: Medium    Uncontrolled type 1 diabetes mellitus ([address] [E10.65]      Priority: Medium       BRIEF HISTORY OF PRESENT ILLNESS: Edenilson Renteria is a 23 y.o. male patient of FLAVIO Youssef CNP who  has a past medical history of Diabetes mellitus ([address] and Lactic acid increased. who originally had concerns including Emesis (COVID positive vomiting for 5 days, unable to keep anything down). at presentation on 2021, and was found to have Metabolic acidosis [E68.3]  Type 1 diabetes mellitus without complication (HCC) [N12.1]  Intractable nausea and vomiting [R11.2]  COVID-19 [U07.1] after workup. Please see H&P for further details. HOSPITAL COURSE:   The patient presented to the hospital with the chief complaint of Emesis (COVID positive vomiting for 5 days, unable to keep anything down). The patient was admitted to the hospital.     · Intractable nausea and vomiting with severe dehydration-likely related to COVID-19 or possibly doxycycline:   ? IV Zofran or IV Reglan as needed.    ? IV fluid hydration.    ? Doxycycline stopped  · Esophageal thickening:   ? GI input appreciated. ? PPI. ? Diflucan for presumed Candida esophagitis.    ? EGD as an OP after COVID is resolved. · Type 1 DM, controlled:   ? Continue home DM meds-- adjust as needed. ? SSI.   ? HbA1c.   ? Consider endocrinology consult as an OP  · COVID-19 viral pneumonia w/o hypoxia  · Contact/droplet isolation.   · Decadron stopped  · Baricitinib not indicated  · BID Lovenox. · Follow inflammatory markers. · Tylenol prn fevers or myalgias.    · Cough meds. · Elevated procalcitonin: IV Levaquin.  Discontinued doxycycline. · Low vitamin D: Replace.  Needs followed as an outpatient in 6 weeks  · DC'ed to home    BRIEF PHYSICAL EXAMINATION AND LABORATORIES ON DAY OF DISCHARGE:  VITALS:  BP (!) 146/102   Pulse 84   Temp 98.2 °F (36.8 °C) (Oral)   Resp 16   Ht 6' (1.829 m)   Wt 200 lb (90.7 kg)   SpO2 97%   BMI 27.12 kg/m²     Please see note from the same day. LABS[de-identified]  Recent Labs     09/27/21 1921 09/27/21 1921 09/28/21  0506 09/28/21  0508 09/28/21  0601     --  137  --  140   K 4.0  --  4.4  --  4.7   CL 95*  --  102  --  104   CO2 14*  --  17*  --  15*   BUN 15  --  14  --  14   CREATININE 1.0  --  1.0  --  1.0   GLUCOSE 191*   < > 249* 177 284*   CALCIUM 9.7  --  8.4*  --  8.5*    < > = values in this interval not displayed.      Recent Labs     09/27/21 1921 09/28/21  0601   ALKPHOS 80 59   ALT 22 17   AST 27 38   PROT 8.0 6.2*   BILITOT 0.3 0.3   LABALBU 4.2 3.2*     Recent Labs     09/27/21 1921 09/28/21  0601   WBC 9.3 7.4   RBC 5.72 4.60   HGB 17.5* 14.0   HCT 51.2 42.2   MCV 89.5 91.7   MCH 30.6 30.4   MCHC 34.2 33.2   RDW 12.4 12.5    122*   MPV 12.9* 12.7*     Lab Results   Component Value Date    LABA1C 9.3 (H) 09/29/2021    LABA1C 8.3 (H) 06/04/2021    LABA1C 8.5 (H) 12/04/2020     Lab Results   Component Value Date    INR 1.1 09/29/2021    INR 1.1 09/28/2021    PROTIME 11.9 09/29/2021    PROTIME 12.2 09/28/2021      Lab Results   Component Value Date    TSH 2.710 06/04/2021    TSH 1.910 12/04/2020    TSH 2.840 06/02/2020     Lab Results   Component Value Date Mediastinum: There are few prominent but not pathologically enlarged mediastinal and hilar lymph nodes. Heart chambers are not enlarged. No pericardial effusion. No evidence of right heart strain. There is suggestion of diffuse thickening of the mid to distal esophagus. Lungs/pleura: Airway is patent. No endobronchial lesions. Scattered ill-defined mixed airspace/interstitial opacities in the bilateral lungs more pronounced in the right greater than left lower lungs. No pleural effusions or pneumothoraces. No cystic lung parenchymal changes. No bronchiectasis. Upper Abdomen: Limited images of the upper abdomen are unremarkable. Soft Tissues/Bones: The thyroid gland and remaining soft tissue structures of the neck appear unremarkable. No concerning axillary adenopathy. Mild bilateral gynecomastia. Remaining anterior and posterior chest wall is unremarkable. Vertebral body height and alignment is maintained. Multilevel endplate spondylosis. 1.  No evidence of pulmonary embolism. No evidence of right heart strain. 2.  Multifocal ill-defined mixed airspace/interstitial opacities in the bilateral lungs. Imaging features concerning for infection. Commonly reported imaging features of COVID-19 pneumonia are present. Other processes such as influenza pneumonia and organizing pneumonia, as can be seen with drug toxicity and connective tissue disease, can cause a similar imaging pattern. 3.  There is suggestion of diffuse thickening of the mid to distal esophagus. Please see recommendations below. RECOMMENDATIONS: Recommend follow-up chest CT 3-6 months after resolution of patient's symptoms to reassess lung parenchymal changes. After patient recovers from current medical issues, recommend GI consultation. Upper GI esophagram versus upper GI endoscopy recommended. MICROBIOLOGY:  BLOOD CX #1  No results for input(s): BC in the last 72 hours.   BLOOD CX #2  No results for input(s): Lindsay Gonzalez in the last 72 hours. TIP CULTURE  No results for input(s): CXCATHTIP in the last 72 hours. CULTURE, RESPIRATORY   No results for input(s): CULTRESP in the last 72 hours. RESPIRATORY SMEAR  No results for input(s): RESPSMEAR in the last 72 hours. DISPOSITION:  The patient's condition is fair. The patient is being discharged to home    DISCHARGE MEDICATIONS:    Marcella Viera   Home Medication Instructions SER:[id number]    Printed on:10/01/21 5111   Medication Information                      albuterol sulfate HFA (VENTOLIN HFA) 108 (90 Base) MCG/ACT inhaler  Inhale 2 puffs into the lungs every 4 hours as needed for Shortness of Breath (cough)             fluconazole (DIFLUCAN) 200 MG tablet  Take 1 tablet by mouth daily for 7 days             guaiFENesin-codeine (TUSSI-ORGANIDIN NR) 100-10 MG/5ML syrup  Take 5 mLs by mouth 3 times daily as needed for Cough for up to 7 days.              ibuprofen (ADVIL;MOTRIN) 800 MG tablet  Take 1 tablet by mouth every 8 hours as needed for Pain             Insulin Degludec (TRESIBA) 100 UNIT/ML SOLN  Inject 60 Units into the skin nightly              insulin lispro, 1 Unit Dial, (HUMALOG KWIKPEN) 100 UNIT/ML SOPN  Inject into the skin 3 times daily Sliding scale with meals: 1 unit per 10gm of carbohydrates             levoFLOXacin (LEVAQUIN) 750 MG tablet  Take 1 tablet by mouth daily for 7 days             metoclopramide (REGLAN) 5 MG tablet  Take 1 tablet by mouth 3 times daily as needed (nausea)             ondansetron (ZOFRAN ODT) 4 MG disintegrating tablet  Take 1 tablet by mouth every 8 hours as needed for Nausea or Vomiting             pantoprazole (PROTONIX) 40 MG tablet  Take 1 tablet by mouth every morning (before breakfast)             Vitamin D (CHOLECALCIFEROL) 50 MCG (2000 UT) TABS tablet  Take 1 tablet by mouth daily                 Discharge Medication List as of 9/29/2021  6:14 PM        Discharge Medication List as of 9/29/2021  6:14 PM      STOP taking these medications       doxycycline hyclate (VIBRA-TABS) 100 MG tablet Comments:   Reason for Stopping:         insulin lispro (HUMALOG) 100 UNIT/ML injection vial Comments:   Reason for Stopping:         insulin lispro (HUMALOG) 100 UNIT/ML injection vial Comments:   Reason for Stopping:             Discharge Medication List as of 9/29/2021  6:14 PM      START taking these medications    Details   pantoprazole (PROTONIX) 40 MG tablet Take 1 tablet by mouth every morning (before breakfast), Disp-14 tablet, R-0Normal      levoFLOXacin (LEVAQUIN) 750 MG tablet Take 1 tablet by mouth daily for 7 days, Disp-7 tablet, R-0Normal      Vitamin D (CHOLECALCIFEROL) 50 MCG (2000 UT) TABS tablet Take 1 tablet by mouth daily, Disp-60 tablet, R-0Labeling may look different. 25 mcg=1000 Units. Please double check dosages. Normal      metoclopramide (REGLAN) 5 MG tablet Take 1 tablet by mouth 3 times daily as needed (nausea), Disp-21 tablet, R-0Normal      fluconazole (DIFLUCAN) 200 MG tablet Take 1 tablet by mouth daily for 7 days, Disp-7 tablet, R-0Normal             INTERNAL MEDICINE FOLLOW UP/INSTRUCTIONS:  · Follow-up with primary care physician as directed in discharge paperwork. · Please review results of imaging studies with PCP. · Follow-up with consultants as directed by them. · If recurrence or worsening of symptoms go to the ED or call primary care physician. · Diet: ADULT DIET; Regular; 4 carb choices (60 gm/meal)    Preparing for this patient's discharge, including paperwork, orders, instructions, and meeting with patient did required 35 minutes.     Electronically signed by Man Arita DO on 10/1/2021 at 3:35 PM

## 2021-09-29 NOTE — PROGRESS NOTES
CLINICAL PHARMACY NOTE: MEDS TO BEDS    Total # of Prescriptions Filled: 3   The following medications were delivered to the patient:  Pantoprazole 40  metoclopramide 5  Levofloxacin 750    Additional Documentation:

## 2021-09-29 NOTE — PROGRESS NOTES
Internal Medicine Progress Note    Patient's name: Kg Alexander  : 1995  Chief complaints (on day of admission): Emesis (COVID positive vomiting for 5 days, unable to keep anything down)  Admission date: 2021  Date of service: 2021   Room: [facility] SURG  Primary care physician: FLAVIO Anne CNP  Reason for visit: Follow-up for nausea and vomiting     Subjective  Ezequiel Asencio was seen and examined. He was lying in bed. He still does not have a lot of hunger. He barely ate breakfast.  He told me that he wanted to eat lunch so he can go home. He is tolerating meds. Nausea is better. Breathing is stable. He denies any other new complaints or issues at this time. I discussed the case with his mother over the phone today. Review of Systems  There are no new complaints of chest pain, shortness of breath, abdominal pain, diarrhea, constipation.     Hospital Medications  Current Facility-Administered Medications   Medication Dose Route Frequency Provider Last Rate Last Admin    0.9 % sodium chloride infusion   IntraVENous Continuous Alfred Cao,  mL/hr at 21 0348 New Bag at 21 0348    acetaminophen (TYLENOL) tablet 650 mg  650 mg Oral Q6H PRN Alfred Cao, DO        Or    acetaminophen (TYLENOL) suppository 650 mg  650 mg Rectal Q6H PRN Alfred Cao, DO        guaiFENesin-dextromethorphan (ROBITUSSIN DM) 100-10 MG/5ML syrup 5 mL  5 mL Oral Q4H PRN Alfred Rothmanino, DO        Vitamin D (CHOLECALCIFEROL) tablet 2,000 Units  2,000 Units Oral Daily Alfred Rothmanino, DO   2,000 Units at 21 1110    sodium chloride flush 0.9 % injection 10 mL  10 mL IntraVENous 2 times per day Alfred Rothmanino, DO   10 mL at 21    sodium chloride flush 0.9 % injection 10 mL  10 mL IntraVENous PRN Alfred Rothmanino, DO        0.9 % sodium chloride infusion  25 mL IntraVENous PRN Alfred Cao, DO        potassium chloride (KLOR-CON M) extended release tablet 40 mEq  40 mEq Oral PRN Alfred ARRINGTON Volino, DO        Or    potassium bicarb-citric acid (EFFER-K) effervescent tablet 40 mEq  40 mEq Oral PRN Alfred Rothmanino, DO        Or    potassium chloride 10 mEq/100 mL IVPB (Peripheral Line)  10 mEq IntraVENous PRN Alfred Rothmanino, DO        enoxaparin (LOVENOX) injection 30 mg  30 mg SubCUTAneous BID Alfred Cao, DO   30 mg at 09/28/21 2058    ondansetron (ZOFRAN-ODT) disintegrating tablet 4 mg  4 mg Oral Q8H PRN Alfred Cao, DO        Or    ondansetron (ZOFRAN) injection 4 mg  4 mg IntraVENous Q6H PRN Alfred Cao, DO        senna (SENOKOT) tablet 8.6 mg  1 tablet Oral Daily PRN Alfred Cao, DO        insulin lispro (HUMALOG) injection vial 0-12 Units  0-12 Units SubCUTAneous TID WC Alfred Cao, DO   2 Units at 09/29/21 1122    insulin lispro (HUMALOG) injection vial 0-6 Units  0-6 Units SubCUTAneous Nightly Alfred Cao, DO   2 Units at 09/28/21 2102    glucose (GLUTOSE) 40 % oral gel 15 g  15 g Oral PRN Alfred Cao, DO        dextrose 50 % IV solution  12.5 g IntraVENous PRN Alfred Cao, DO        glucagon (rDNA) injection 1 mg  1 mg IntraMUSCular PRN Alfred Cao, DO        dextrose 5 % solution  100 mL/hr IntraVENous PRN Alfred Cao, DO        albuterol sulfate  (90 Base) MCG/ACT inhaler 2 puff  2 puff Inhalation Q4H PRN Alfred Cao, DO        insulin glargine (LANTUS) injection vial 48 Units  48 Units SubCUTAneous Nightly Alfred Cao, DO   48 Units at 09/28/21 2102    pantoprazole (PROTONIX) injection 40 mg  40 mg IntraVENous BID Alfred Cao, DO   40 mg at 09/28/21 2058    metoclopramide (REGLAN) injection 5 mg  5 mg IntraVENous Q6H PRN Riri Henry, DO   5 mg at 09/29/21 0530    levoFLOXacin (LEVAQUIN) 750 MG/150ML infusion 750 mg  750 mg IntraVENous Q24H Alfred Cao, DO   Stopped at 09/28/21 1435    fluconazole (DIFLUCAN) tablet 200 mg  200 mg Oral Daily FLAVIO Lombardi CNP   200 mg at 09/28/21 5609 PRN Medications  acetaminophen **OR** acetaminophen, guaiFENesin-dextromethorphan, sodium chloride flush, sodium chloride, potassium chloride **OR** potassium alternative oral replacement **OR** potassium chloride, ondansetron **OR** ondansetron, senna, glucose, dextrose, glucagon (rDNA), dextrose, albuterol sulfate HFA, metoclopramide    Objective  Most Recent Recorded Vitals  BP (!) 146/102   Pulse 84   Temp 98.2 °F (36.8 °C) (Oral)   Resp 16   Ht 6' (1.829 m)   Wt 200 lb (90.7 kg)   SpO2 97%   BMI 27.12 kg/m²   I/O last 3 completed shifts: In: 5675 [P.O.:120; I.V.:1295]  Out: -   No intake/output data recorded. Physical Exam:  General: AAO to person/place/time/purpose, NAD, no labored breathing, he seemed uncomfortable and not willing to talk much  Eyes: conjunctivae/corneas clear, sclera non icteric  Skin: color/texture/turgor normal, no rashes or lesions  Lungs: CTAB, no retractions/use of accessory muscles, no vocal fremitus, no rhonchi, no crackle, no rales  Heart: regular rate, regular rhythm, no murmur  Abdomen: soft, NT, bowel sounds normal  Extremities: atraumatic, no edema  Neurologic: cranial nerves 2-12 grossly intact, no slurred speech    Most Recent Labs  Lab Results   Component Value Date    WBC 7.4 09/28/2021    HGB 14.0 09/28/2021    HCT 42.2 09/28/2021     (L) 09/28/2021     09/28/2021    K 4.7 09/28/2021     09/28/2021    CREATININE 1.0 09/28/2021    BUN 14 09/28/2021    CO2 15 (L) 09/28/2021    GLUCOSE 284 (H) 09/28/2021    ALT 17 09/28/2021    AST 38 09/28/2021    INR 1.1 09/28/2021    TSH 2.710 06/04/2021    LABA1C 8.3 (H) 06/04/2021    LABMICR 17.7 01/14/2015       CT CHEST PULMONARY EMBOLISM W CONTRAST   Final Result   1. No evidence of pulmonary embolism. No evidence of right heart strain. 2.  Multifocal ill-defined mixed airspace/interstitial opacities in the   bilateral lungs. Imaging features concerning for infection.       Commonly reported imaging features of COVID-19 pneumonia are present. Other   processes such as influenza pneumonia and organizing pneumonia, as can be   seen with drug toxicity and connective tissue disease, can cause a similar   imaging pattern. 3.  There is suggestion of diffuse thickening of the mid to distal esophagus. Please see recommendations below. RECOMMENDATIONS:   Recommend follow-up chest CT 3-6 months after resolution of patient's   symptoms to reassess lung parenchymal changes. After patient recovers from current medical issues, recommend GI   consultation. Upper GI esophagram versus upper GI endoscopy recommended. Assessment   Active Hospital Problems    Diagnosis     Intractable nausea and vomiting [R11.2]      Priority: High    Severe dehydration [E86.0]      Priority: Medium    COVID-19 [U07.1]      Priority: Medium    Uncontrolled type [facility] diabetes mellitus ([facility] [E10.65]      Priority: Medium         Plan  · Intractable nausea and vomiting with severe dehydration-likely related to COVID-19 or possibly doxycycline:   · IV Zofran or IV Reglan as needed. · IV fluid hydration. · Doxycycline stopped  · Esophageal thickening:   · GI input appreciated. · PPI. · Diflucan for presumed Candida esophagitis. · EGD as an OP after COVID is resolved. · Type 1 DM, controlled:   · Continue home DM meds-- adjust as needed. · SSI. · HbA1c.   · Consider endocrinology consult as an OP  · COVID-19 viral pneumonia w/o hypoxia  · Contact/droplet isolation. · Decadron-modified dose due to diabetes. · Baricitinib  · BID Lovenox. · Follow inflammatory markers. · Tylenol prn fevers or myalgias. · Cough meds. · Pharmacy consultation for baricitinib. · Elevated procalcitonin: IV Levaquin. Discontinued doxycycline. · Low vitamin D: Replace. Needs followed as an outpatient in 6 weeks  · Follow labs  · DVT prophylaxis.   · Please see orders for further management and care.  ·  for discharge planning  · Discharge plan: home soon-possibly later today if he is able to tolerate lunch-    Electronically signed by Matti Lind DO on 9/29/2021 at 12:25 PM    I can be reached through [email or web address].

## 2021-09-29 NOTE — CARE COORDINATION
COVID + 9/24. Attempted to call pt in room- no answer. Phone conversation w/ pt's mother Gracie. Explained role of  and plan of care. Pt lives w/ his scott and 2 children( ages 1 & 11) in a 1 story house- 2 steps to entrance. Independent PTA. No Hx DMEs, HHC, or JODIE. PCP is Raeann Dakin and pharmacy is [facility]. Plan is to return home on discharge- mother will transport home- no needs.  Ricardo Lau RN case manager

## 2021-09-30 ENCOUNTER — CARE COORDINATION (OUTPATIENT)
Dept: OTHER | Facility: CLINIC | Age: 26
End: 2021-09-30

## 2021-09-30 NOTE — CARE COORDINATION
Daniel [facility] Transitions Initial Follow Up Call    Call within 2 business days of discharge: Yes    Patient: Johnson Choudhary Patient : 1995   MRN: [id number]  Reason for Admission: COVID +/ hyperglycemia/ intractable N/V  Discharge Date: 21 RARS: No data recorded    Last Discharge [facility]       Complaint Diagnosis Description Type Department Provider    21 Emesis Intractable nausea and vomiting . .. ED to Hosp-Admission (Discharged) (ADMITTED) 2 [facility], DO; Escobar Jimenez. .. Patient contacted regarding COVID-19 diagnosis. Discussed COVID-19 related testing which was available at this time. Test results were positive. Patient informed of results, if available? Yes    Ambulatory Care Manager contacted the parent by telephone to perform post discharge assessment. Call within 2 business days of discharge: Yes. Verified name and  with parent as identifiers. Provided introduction to self, and explanation of the ACM role, and reason for call due to risk factors for infection, exposure to COVID-19 and/ or diagnosis. Patient's mother reports that patient is doing \"fine\". She is an RN and able to care for him at home. She denied any ACM needs and declined follow up contact. Was patient discharged with a pulse oximeter? Undetermined, was not discussed. parent who verbalized understanding of and confirms:     Red Flag Symptoms to report  Has support at home: Mother is an RN and able to care for patient    Discussed COVID vaccination status No.     Due to no new or worsening symptoms encounter was not routed to provider for escalation. Discussed follow-up appointments. If no appointment was previously scheduled, appointment scheduling offered: No, declined  [address] follow up appointment(s): No future appointments.   Non-[facility] follow up appointment(s): None    Non-face-to-face services provided:  Obtained and reviewed discharge summary and/or continuity of care documents  Assessment and support for treatment adherence and medication management-Patient's mother denied any needs at this time     Advance Care Planning:   Does patient have an Advance Directive:  Did not discuss. ACM provided contact information. No further follow-up call identified based on severity of symptoms and risk factors. Care Transitions 24 Hour Call    Schedule Follow Up Appointment with PCP: Declined  Do you have any ongoing symptoms?: No  Do you have a copy of your discharge instructions?: Yes  Do you have all of your prescriptions and are they filled?: Yes  Have you been contacted by a [address] [employer] Pharmacist?: No  Have you scheduled your follow up appointment?: No  Were you discharged with any Home Care or Post Acute Services: No  Do you feel like you have everything you need to keep you well at home?: Yes  Care Transitions Interventions  No Identified Needs         Follow Up  No future appointments.     Hannah Grande RN

## 2021-09-30 NOTE — PROGRESS NOTES
Fluconazole was transferred to [facility] per patient request. Patient was already discharged when script came to us

## 2021-12-03 LAB
ALBUMIN SERPL-MCNC: 4.3 G/DL (ref 3.5–5.2)
ALP BLD-CCNC: 99 U/L (ref 40–129)
ALT SERPL-CCNC: 23 U/L (ref 0–40)
ANION GAP SERPL CALCULATED.3IONS-SCNC: 13 MMOL/L (ref 7–16)
AST SERPL-CCNC: 17 U/L (ref 0–39)
BASOPHILS ABSOLUTE: 0.02 E9/L (ref 0–0.2)
BASOPHILS RELATIVE PERCENT: 0.5 % (ref 0–2)
BILIRUB SERPL-MCNC: 0.8 MG/DL (ref 0–1.2)
BUN BLDV-MCNC: 17 MG/DL (ref 6–20)
CALCIUM SERPL-MCNC: 9.6 MG/DL (ref 8.6–10.2)
CHLORIDE BLD-SCNC: 101 MMOL/L (ref 98–107)
CHOLESTEROL, TOTAL: 245 MG/DL (ref 0–199)
CO2: 24 MMOL/L (ref 22–29)
CREAT SERPL-MCNC: 0.9 MG/DL (ref 0.7–1.2)
EOSINOPHILS ABSOLUTE: 0.07 E9/L (ref 0.05–0.5)
EOSINOPHILS RELATIVE PERCENT: 1.8 % (ref 0–6)
GFR AFRICAN AMERICAN: >60
GFR NON-AFRICAN AMERICAN: >60 ML/MIN/1.73
GLUCOSE BLD-MCNC: 254 MG/DL (ref 74–99)
HBA1C MFR BLD: 8.3 % (ref 4–5.6)
HCT VFR BLD CALC: 44.8 % (ref 37–54)
HDLC SERPL-MCNC: 23 MG/DL
HEMOGLOBIN: 15.2 G/DL (ref 12.5–16.5)
IMMATURE GRANULOCYTES #: 0.01 E9/L
IMMATURE GRANULOCYTES %: 0.3 % (ref 0–5)
LDL CHOLESTEROL CALCULATED: ABNORMAL MG/DL (ref 0–99)
LYMPHOCYTES ABSOLUTE: 1.17 E9/L (ref 1.5–4)
LYMPHOCYTES RELATIVE PERCENT: 29.8 % (ref 20–42)
MCH RBC QN AUTO: 31.7 PG (ref 26–35)
MCHC RBC AUTO-ENTMCNC: 33.9 % (ref 32–34.5)
MCV RBC AUTO: 93.3 FL (ref 80–99.9)
MONOCYTES ABSOLUTE: 0.32 E9/L (ref 0.1–0.95)
MONOCYTES RELATIVE PERCENT: 8.1 % (ref 2–12)
NEUTROPHILS ABSOLUTE: 2.34 E9/L (ref 1.8–7.3)
NEUTROPHILS RELATIVE PERCENT: 59.5 % (ref 43–80)
PDW BLD-RTO: 13.3 FL (ref 11.5–15)
PLATELET # BLD: 132 E9/L (ref 130–450)
PMV BLD AUTO: 13.2 FL (ref 7–12)
POTASSIUM SERPL-SCNC: 4.6 MMOL/L (ref 3.5–5)
RBC # BLD: 4.8 E12/L (ref 3.8–5.8)
SODIUM BLD-SCNC: 138 MMOL/L (ref 132–146)
TOTAL PROTEIN: 7.2 G/DL (ref 6.4–8.3)
TRIGL SERPL-MCNC: 595 MG/DL (ref 0–149)
TSH SERPL DL<=0.05 MIU/L-ACNC: 6.85 UIU/ML (ref 0.27–4.2)
VLDLC SERPL CALC-MCNC: ABNORMAL MG/DL
WBC # BLD: 3.9 E9/L (ref 4.5–11.5)

## 2021-12-06 LAB — URINE CULTURE, ROUTINE: NORMAL

## 2022-01-24 ENCOUNTER — HOSPITAL ENCOUNTER (OUTPATIENT)
Dept: MRI IMAGING | Age: 27
Discharge: HOME | End: 2022-01-26
Payer: COMMERCIAL

## 2022-01-24 DIAGNOSIS — R29.898 LEFT LEG WEAKNESS: ICD-10-CM

## 2022-01-24 DIAGNOSIS — M51.36 DDD (DEGENERATIVE DISC DISEASE), LUMBAR: ICD-10-CM

## 2022-01-24 PROCEDURE — 72148 MRI LUMBAR SPINE W/O DYE: CPT

## 2022-11-17 ENCOUNTER — OFFICE VISIT (OUTPATIENT)
Dept: ENDOCRINOLOGY | Age: 27
End: 2022-11-17
Payer: COMMERCIAL

## 2022-11-17 VITALS
OXYGEN SATURATION: 99 % | SYSTOLIC BLOOD PRESSURE: 147 MMHG | DIASTOLIC BLOOD PRESSURE: 90 MMHG | HEART RATE: 79 BPM | WEIGHT: 258 LBS | BODY MASS INDEX: 34.19 KG/M2 | RESPIRATION RATE: 18 BRPM | HEIGHT: 73 IN

## 2022-11-17 DIAGNOSIS — E55.9 VITAMIN D DEFICIENCY: ICD-10-CM

## 2022-11-17 DIAGNOSIS — E11.65 POORLY CONTROLLED DIABETES MELLITUS (HCC): Primary | ICD-10-CM

## 2022-11-17 DIAGNOSIS — Z91.119 DIETARY NONCOMPLIANCE: ICD-10-CM

## 2022-11-17 LAB — HBA1C MFR BLD: 8.1 %

## 2022-11-17 PROCEDURE — 83036 HEMOGLOBIN GLYCOSYLATED A1C: CPT | Performed by: INTERNAL MEDICINE

## 2022-11-17 PROCEDURE — 99204 OFFICE O/P NEW MOD 45 MIN: CPT | Performed by: INTERNAL MEDICINE

## 2022-11-17 PROCEDURE — 3052F HG A1C>EQUAL 8.0%<EQUAL 9.0%: CPT | Performed by: INTERNAL MEDICINE

## 2022-11-17 RX ORDER — INSULIN LISPRO 100 [IU]/ML
INJECTION, SOLUTION INTRAVENOUS; SUBCUTANEOUS
Qty: 10 ADJUSTABLE DOSE PRE-FILLED PEN SYRINGE | Refills: 3
Start: 2022-11-17

## 2022-11-17 NOTE — PROGRESS NOTES
[address] [place] [address] Department of Endocrinology Diabetes and Metabolism   [address] [facility] [address]   Phone: [phone]  Fax: [phone]    Date of Service: 11/17/2022  Primary Care Physician: FLAVIO Rocha CNP  Referring physician: Bea Miles MD  Provider: Leesa Perkins MD    Reason for the visit:  DM type 1     History of Present Illness: The history is provided by the patient. No  was used. Accuracy of the patient data is excellent. Tess Ontiveros is a very pleasant 33 y.o. male seen today for diabetes management     Tess Ontiveros was diagnosed with diabetes at age  7 and currently on Tresiba 60 units daily in AM, Humalog 1u:10g + ss 1:50>150   The patient has been checking blood sugar 2-3 times/day   Most recent A1c results summarized below  Lab Results   Component Value Date/Time    LABA1C 8.1 11/17/2022 09:14 AM    LABA1C 8.3 12/03/2021 09:45 AM    LABA1C 9.3 09/29/2021 12:30 PM     Patient reported hypoglycemic episodes  The patient hasn't been mindful of what has been eating and wasn't following diabetes diet    I reviewed current medications and the patient has no issues with diabetes medications  The patient is due for an eye exam. no h/o diabetic retinopathy  The patient  performs his own feet care  Microvascular complications:  No Retinopathy, Nephropathy or Neuropathy   Macrovascular complications: no CAD, PVD, or Stroke  The patient refuses Flushot   PAST MEDICAL HISTORY   Past Medical History:   Diagnosis Date    Diabetes mellitus ([address]     Lactic acid increased 6/12/2016       PAST SURGICAL HISTORY   Past Surgical History:   Procedure Laterality Date    APPENDECTOMY         SOCIAL HISTORY   Tobacco:   reports that he has never smoked. His smokeless tobacco use includes chew. Alcohol:   reports no history of alcohol use. Drugs:   reports no history of drug use.     FAMILY HISTORY   Family History   Problem Relation Age of Onset    Cancer Neg Hx     Heart Disease Neg Hx     Stroke Neg Hx        ALLERGIES AND DRUG REACTIONS   Allergies   Allergen Reactions    Niacin And Related Rash       CURRENT MEDICATIONS   Current Outpatient Medications   Medication Sig Dispense Refill    pantoprazole (PROTONIX) 40 MG tablet Take 1 tablet by mouth every morning (before breakfast) 14 tablet 0    Vitamin D (CHOLECALCIFEROL) 50 MCG (2000 UT) TABS tablet Take 1 tablet by mouth daily 60 tablet 0    metoclopramide (REGLAN) 5 MG tablet Take 1 tablet by mouth 3 times daily as needed (nausea) 21 tablet 0    insulin lispro, 1 Unit Dial, (HUMALOG/ADMELOG) 100 UNIT/ML SOPN Inject into the skin 3 times daily Sliding scale with meals: 1 unit per 10gm of carbohydrates      ibuprofen (ADVIL;MOTRIN) 800 MG tablet Take 1 tablet by mouth every 8 hours as needed for Pain 12 tablet 0    ondansetron (ZOFRAN ODT) 4 MG disintegrating tablet Take 1 tablet by mouth every 8 hours as needed for Nausea or Vomiting 30 tablet 0    albuterol sulfate HFA (VENTOLIN HFA) 108 (90 Base) MCG/ACT inhaler Inhale 2 puffs into the lungs every 4 hours as needed for Shortness of Breath (cough) 18 g 0    Insulin Degludec (TRESIBA) 100 UNIT/ML SOLN Inject 60 Units into the skin nightly        No current facility-administered medications for this visit. Review of Systems  Constitutional: No fever, no chills, no diaphoresis, no generalized weakness. HEENT: No blurred vision, No sore throat, no ear pain, no hair loss  Neck: denied any neck swelling, difficulty swallowing,   Cardio-pulmonary: No CP, SOB or palpitation, No orthopnea or PND. No cough or wheezing. GI: No N/V/D, no constipation, No abdominal pain, no melena or hematochezia   : Denied any dysuria, hematuria, flank pain, discharge, or incontinence. Skin: denied any rash, ulcer, Hirsute, or hyperpigmentation. MSK: denied any joint deformity, joint pain/swelling, muscle pain, or back pain.   Neuro: no numbness, no tingling, no weakness, _    OBJECTIVE    BP (!) 147/90   Pulse 79   Resp 18   Ht 6' 1\" (1.854 m)   Wt 258 lb (117 kg)   SpO2 99%   BMI 34.04 kg/m²   BP Readings from Last 4 Encounters:   11/17/22 (!) 147/90   09/29/21 (!) 146/102   09/24/21 124/77   09/10/21 (!) 154/99     Wt Readings from Last 6 Encounters:   11/17/22 258 lb (117 kg)   09/27/21 200 lb (90.7 kg)   09/24/21 180 lb (81.6 kg)   09/10/21 200 lb (90.7 kg)   03/13/20 248 lb 9.6 oz (112.8 kg)   01/05/20 210 lb (95.3 kg)       Physical examination:  General: awake alert, oriented x3, no abnormal position or movements. HEENT: normocephalic non-traumatic, no exophthalmos   Neck: supple, no LN enlargement, no thyromegaly, no thyroid tenderness, no JVD. Pulm: Clear equal air entry no added sounds, no wheezing or rhonchi    CVS: S1 + S2, no murmur, no heave. Dorsalis pedis pulse palpable   Abd: soft lax, no tenderness, no organomegaly, audible bowel sounds. Skin: warm, no lesions, no rash.  No callus, no Ulcers, No acanthosis nigricans  Musculoskeletal: No back tenderness, no kyphosis/scoliosis    Neuro: CN intact, Monofilament sensation decreased bilateral , muscle power normal  Psych: normal mood, and affect    Review of Laboratory Data:  I personally reviewed the following lab:  Lab Results   Component Value Date/Time    WBC 3.9 (L) 12/03/2021 09:45 AM    RBC 4.80 12/03/2021 09:45 AM    HGB 15.2 12/03/2021 09:45 AM    HCT 44.8 12/03/2021 09:45 AM    MCV 93.3 12/03/2021 09:45 AM    MCH 31.7 12/03/2021 09:45 AM    MCHC 33.9 12/03/2021 09:45 AM    RDW 13.3 12/03/2021 09:45 AM     12/03/2021 09:45 AM    MPV 13.2 (H) 12/03/2021 09:45 AM      Lab Results   Component Value Date/Time     12/03/2021 09:45 AM    K 4.6 12/03/2021 09:45 AM    K 4.0 09/29/2021 12:30 PM    CO2 24 12/03/2021 09:45 AM    BUN 17 12/03/2021 09:45 AM    CREATININE 0.9 12/03/2021 09:45 AM    CALCIUM 9.6 12/03/2021 09:45 AM    LABGLOM >60 12/03/2021 09:45 AM GFRAA >60 12/03/2021 09:45 AM      Lab Results   Component Value Date/Time    TSH 6.850 (H) 12/03/2021 09:45 AM    T4FREE 1.21 07/17/2016 07:42 AM     Lab Results   Component Value Date/Time    LABA1C 8.1 11/17/2022 09:14 AM    GLUCOSE 254 12/03/2021 09:45 AM    MALBCR 13.8 01/14/2015 07:57 AM    LABMICR 17.7 01/14/2015 07:57 AM    LABCREA 128 01/14/2015 07:57 AM     Lab Results   Component Value Date/Time    LABA1C 8.1 11/17/2022 09:14 AM    LABA1C 8.3 12/03/2021 09:45 AM    LABA1C 9.3 09/29/2021 12:30 PM     Lab Results   Component Value Date/Time    TRIG 595 12/03/2021 09:45 AM    HDL 23 12/03/2021 09:45 AM    LDLCALC - 12/03/2021 09:45 AM    CHOL 245 12/03/2021 09:45 AM     Lab Results   Component Value Date/Time    VITD25 16 09/28/2021 06:01 AM    VITD25 24 06/04/2021 09:30 AM       ASSESSMENT & RECOMMENDATIONS   Zara Choudhary, a 33 y.o.-old male seen in for the following issues     Diabetes Mellitus Type 1     Patient's diabetes is uncontrol   Change Tresiba 45  units daily   Change Humalog 1u:8g of carbs + sliding scale 2:50>150   Will order insulin pump and CGM   The patient was advised to check blood sugars 4 times a day before meals and at bedtime and send BS readings to our office in a week. Discussed with patient A1c and blood sugar goals   Patient will need routine diabetes maintenance and prevention  Diabetes labs before next visit     Dietary noncompliance  Discussed with patient the importance of eating consistent carbohydrate meals, avoiding high glycemic index food. Also, discussed with patient the risk and negative consequences of dietary noncompliance on blood glucose control, blood pressure and weight      I personally reviewed external notes from PCP and other patient's care team providers, and personally interpreted labs associated with the above diagnosis. I also ordered labs to further assess and manage the above addressed medical conditions.      Return in about 7 weeks (around 1/5/2023) for DM type 1. The above issues were reviewed with the patient who understood and agreed with the plan. Thank you for allowing us to participate in the care of this patient. Please do not hesitate to contact us with any additional questions. Diagnosis Orders   1. Poorly controlled diabetes mellitus (HCC)  POCT glycosylated hemoglobin (Hb A1C)    Insulin Degludec 100 UNIT/ML SOPN    insulin lispro, 1 Unit Dial, (HUMALOG KWIKPEN) 100 UNIT/ML SOPN    Comprehensive Metabolic Panel    Lipid Panel    Microalbumin / Creatinine Urine Ratio      2. Vitamin D deficiency        3. Dietary noncompliance            Moreno Jaime MD  Endocrinologist, [facility] Diabetes Care and Endocrinology   [address] [facility] [address]   Phone: [phone]  Fax: [phone]  --------------------------------------------  An electronic signature was used to authenticate this note.  [address] Peter Smith MD on 11/17/2022 at 9:33 AM

## 2022-11-17 NOTE — PATIENT INSTRUCTIONS
Recommendations for today's visit  Change Tresiba 45  units daily   Change Humalog 1u:8g of carbs + sliding scale   Blood sugar 150-200: add 2 Units of Humalog  Blood sugar 201-250 : Add 4 Units ofHumalog  Blood Sugar 251 - 300: Add 6 Units of Humalog  Blood Sugar 301-350: Add 8  Units of Humalog  Blood Sugar 350-400: Add 10 Units of Humalog  Blood sugar  >400: Add 12 Units of Humalog     Check Blood sugar 4 times/day before meals and at bedtime and send us sugar log in a week    These are your blood sugar, blood pressure, cholesterol and A1c goals:  Blood sugar fastin mg/dl to 130 mg/dl  Blood sugar before meals: <150 mg/dl  Peak blood sugar lower than 180 mg/dl  A1c: between 6.5 - 7.5%    I you have any questions please call Dr. Allie Herrera office     Tyson Boxer MD  Endocrinologist, [facility])   [address] [place] [address], [address] [place] [address],[facility] [address] [address]   Phone: [phone]  Fax: [phone]  Email: [email or web address]

## 2022-11-20 ENCOUNTER — TELEPHONE (OUTPATIENT)
Dept: ENDOCRINOLOGY | Age: 27
End: 2022-11-20

## 2022-11-20 NOTE — TELEPHONE ENCOUNTER
Please make sure to give this pt's name to [employer] to get him Omnipod-5 + [facility] [place] [facility]

## 2022-11-28 DIAGNOSIS — E11.65 POORLY CONTROLLED DIABETES MELLITUS (HCC): Primary | ICD-10-CM

## 2022-11-28 RX ORDER — INSULIN PMP CART,AUT,G6/7,CNTR
EACH SUBCUTANEOUS
Qty: 1 KIT | Refills: 0 | Status: SHIPPED | OUTPATIENT
Start: 2022-11-28

## 2022-11-28 RX ORDER — BLOOD-GLUCOSE SENSOR
EACH MISCELLANEOUS
Qty: 9 EACH | Refills: 3 | Status: SHIPPED | OUTPATIENT
Start: 2022-11-28

## 2022-11-28 RX ORDER — INSULIN PMP CART,AUT,G6/7,CNTR
EACH SUBCUTANEOUS
Qty: 30 EACH | Refills: 3 | Status: SHIPPED | OUTPATIENT
Start: 2022-11-28

## 2022-11-28 RX ORDER — BLOOD-GLUCOSE TRANSMITTER
EACH MISCELLANEOUS
Qty: 1 EACH | Refills: 3 | Status: SHIPPED | OUTPATIENT
Start: 2022-11-28

## 2022-12-27 PROBLEM — E10.65 UNCONTROLLED TYPE 1 DIABETES MELLITUS WITH HYPERGLYCEMIA (HCC): Status: ACTIVE | Noted: 2022-11-17

## 2023-01-02 LAB
ALBUMIN SERPL-MCNC: 4.2 G/DL
ALP BLD-CCNC: 54 U/L
ALT SERPL-CCNC: 24 U/L
ANION GAP SERPL CALCULATED.3IONS-SCNC: NORMAL MMOL/L
AST SERPL-CCNC: 18 U/L
BILIRUB SERPL-MCNC: 0.8 MG/DL (ref 0.1–1.4)
BUN BLDV-MCNC: 19 MG/DL
CALCIUM SERPL-MCNC: 9 MG/DL
CHLORIDE BLD-SCNC: 105 MMOL/L
CHOLESTEROL, TOTAL: 184 MG/DL
CHOLESTEROL/HDL RATIO: ABNORMAL
CO2: 24 MMOL/L
CREAT SERPL-MCNC: 1.1 MG/DL
CREATININE, URINE: 245.8
GFR CALCULATED: NORMAL
GLUCOSE BLD-MCNC: 200 MG/DL
HDLC SERPL-MCNC: 33 MG/DL (ref 35–70)
LDL CHOLESTEROL CALCULATED: 132 MG/DL (ref 0–160)
MICROALBUMIN/CREAT 24H UR: 106.6 MG/G{CREAT}
MICROALBUMIN/CREAT UR-RTO: 43.4
NONHDLC SERPL-MCNC: ABNORMAL MG/DL
POTASSIUM SERPL-SCNC: 4.1 MMOL/L
SODIUM BLD-SCNC: 136 MMOL/L
TOTAL PROTEIN: 7.2
TRIGL SERPL-MCNC: 125 MG/DL
VLDLC SERPL CALC-MCNC: ABNORMAL MG/DL

## 2023-01-04 DIAGNOSIS — E11.65 POORLY CONTROLLED DIABETES MELLITUS (HCC): ICD-10-CM

## 2023-01-09 ENCOUNTER — OFFICE VISIT (OUTPATIENT)
Dept: ENDOCRINOLOGY | Age: 28
End: 2023-01-09
Payer: COMMERCIAL

## 2023-01-09 VITALS
HEART RATE: 74 BPM | SYSTOLIC BLOOD PRESSURE: 124 MMHG | DIASTOLIC BLOOD PRESSURE: 83 MMHG | OXYGEN SATURATION: 96 % | BODY MASS INDEX: 33.91 KG/M2 | WEIGHT: 257 LBS

## 2023-01-09 DIAGNOSIS — Z91.119 DIETARY NONCOMPLIANCE: ICD-10-CM

## 2023-01-09 DIAGNOSIS — E55.9 VITAMIN D DEFICIENCY: ICD-10-CM

## 2023-01-09 DIAGNOSIS — E11.65 POORLY CONTROLLED DIABETES MELLITUS (HCC): Primary | ICD-10-CM

## 2023-01-09 PROCEDURE — 99214 OFFICE O/P EST MOD 30 MIN: CPT | Performed by: INTERNAL MEDICINE

## 2023-01-09 PROCEDURE — 95251 CONT GLUC MNTR ANALYSIS I&R: CPT | Performed by: INTERNAL MEDICINE

## 2023-01-09 NOTE — PATIENT INSTRUCTIONS
Recommendations for today's visit  Continue  Tresiba 45  units daily   Change Humalog 1u:5g of carbs + sliding scale   Blood sugar 150-200: add 3 Units of Humalog  Blood sugar 201-250 : Add 6 Units ofHumalog  Blood Sugar 251 - 300: Add 9 Units of Humalog  Blood Sugar 301-350: Add 12  Units of Humalog  Blood Sugar 350-400: Add 15 Units of Humalog  Blood sugar  >400: Add 18 Units of Humalog    Check Blood sugar 4 times/day before meals and at bedtime and send us sugar log in a week    These are your blood sugar, blood pressure, cholesterol and A1c goals:  Blood sugar fastin mg/dl to 130 mg/dl  Blood sugar before meals: <150 mg/dl  Peak blood sugar lower than 180 mg/dl  A1c: between 6.5 - 7.5%    I you have any questions please call Dr. Alhaji Varner office     Danielle Weber MD  Endocrinologist, [facility])   [address] [facility], [address] [place] [address],[facility] [address]   Phone: [phone]  Fax: [phone]  Email: [email or web address]

## 2023-01-09 NOTE — PROGRESS NOTES
[address] [place] [address] Department of Endocrinology Diabetes and Metabolism   [address] [facility] [address]   Phone: [phone]  Fax: [phone]    Date of Service: 1/9/2023  Primary Care Physician: FLAVIO Barr - CNP  Referring physician: No ref. provider found  Provider: Shanell Cowan MD    Reason for the visit:  DM type 1     History of Present Illness: The history is provided by the patient. No  was used. Accuracy of the patient data is excellent. Ameena Vaughan is a very pleasant 33 y.o. male seen today for diabetes management     Ameena Vaughan was diagnosed with diabetes at age  7 and currently on Tresiba 45 units daily in AM, Humalog 1u:8g + ss 2:50>150   The patient has been using DEXCOM CGM all the time. I have reviewed and interpreted continuous glucose monitor (CGM) download. Time in range 40%, high 58%, low  2%  Most recent A1c results summarized below  Lab Results   Component Value Date/Time    LABA1C 8.1 11/17/2022 09:14 AM    LABA1C 8.3 12/03/2021 09:45 AM    LABA1C 9.3 09/29/2021 12:30 PM     Patient reported hypoglycemic episodes  The patient hasn't been mindful of what has been eating and wasn't following diabetes diet    I reviewed current medications and the patient has no issues with diabetes medications  The patient is due for an eye exam. no h/o diabetic retinopathy  The patient  performs his own feet care  Microvascular complications:  No Retinopathy, Nephropathy or Neuropathy   Macrovascular complications: no CAD, PVD, or Stroke  The patient refuses Flushot   PAST MEDICAL HISTORY   Past Medical History:   Diagnosis Date    Diabetes mellitus ([address]     Lactic acid increased 6/12/2016       PAST SURGICAL HISTORY   Past Surgical History:   Procedure Laterality Date    APPENDECTOMY         SOCIAL HISTORY   Tobacco:   reports that he has never smoked. His smokeless tobacco use includes chew.   Alcohol:   reports no history of alcohol use. Drugs:   reports no history of drug use. FAMILY HISTORY   Family History   Problem Relation Age of Onset    Cancer Neg Hx     Heart Disease Neg Hx     Stroke Neg Hx        ALLERGIES AND DRUG REACTIONS   Allergies   Allergen Reactions    Niacin And Related Rash       CURRENT MEDICATIONS   Current Outpatient Medications   Medication Sig Dispense Refill    Continuous Blood Gluc Transmit (DEXCOM G6 TRANSMITTER) MISC To change every 90 days 1 each 3    Continuous Blood Gluc Sensor (DEXCOM G6 SENSOR) MISC To change every 10 days 9 each 3    Insulin Degludec 100 UNIT/ML SOPN Inject 45 units daily 10 Adjustable Dose Pre-filled Pen Syringe 3    insulin lispro, 1 Unit Dial, (HUMALOG KWIKPEN) 100 UNIT/ML SOPN Inject 3 times a day with meals using  carb ratio 1u:8g + sliding scale. -200 add 2U, -250 add 4U, -300 add 6U, -350 add 8U, -400 add 10U, BS over 400 add 12U 10 Adjustable Dose Pre-filled Pen Syringe 3    Vitamin D (CHOLECALCIFEROL) 50 MCG (2000 UT) TABS tablet Take 1 tablet by mouth daily 60 tablet 0    ibuprofen (ADVIL;MOTRIN) 800 MG tablet Take 1 tablet by mouth every 8 hours as needed for Pain 12 tablet 0    Insulin Disposable Pump (OMNIPOD 5 G6 INTRO, GEN 5,) KIT To use as directed (Patient not taking: Reported on 1/9/2023) 1 kit 0    Insulin Disposable Pump (OMNIPOD 5 G6 POD, GEN 5,) MISC To change every 72hrs (Patient not taking: Reported on 1/9/2023) 30 each 3    pantoprazole (PROTONIX) 40 MG tablet Take 1 tablet by mouth every morning (before breakfast) 14 tablet 0    metoclopramide (REGLAN) 5 MG tablet Take 1 tablet by mouth 3 times daily as needed (nausea) 21 tablet 0    ondansetron (ZOFRAN ODT) 4 MG disintegrating tablet Take 1 tablet by mouth every 8 hours as needed for Nausea or Vomiting 30 tablet 0     No current facility-administered medications for this visit.        Review of Systems  Constitutional: No fever, no chills, no diaphoresis, no generalized weakness. HEENT: No blurred vision, No sore throat, no ear pain, no hair loss  Neck: denied any neck swelling, difficulty swallowing,   Cardio-pulmonary: No CP, SOB or palpitation, No orthopnea or PND. No cough or wheezing. GI: No N/V/D, no constipation, No abdominal pain, no melena or hematochezia   : Denied any dysuria, hematuria, flank pain, discharge, or incontinence. Skin: denied any rash, ulcer, Hirsute, or hyperpigmentation. MSK: denied any joint deformity, joint pain/swelling, muscle pain, or back pain. Neuro: no numbness, no tingling, no weakness, _    OBJECTIVE    /83   Pulse 74   Wt 257 lb (116.6 kg)   SpO2 96%   BMI 33.91 kg/m²   BP Readings from Last 4 Encounters:   01/09/23 124/83   11/17/22 (!) 147/90   09/29/21 (!) 146/102   09/24/21 124/77     Wt Readings from Last 6 Encounters:   01/09/23 257 lb (116.6 kg)   11/17/22 258 lb (117 kg)   09/27/21 200 lb (90.7 kg)   09/24/21 180 lb (81.6 kg)   09/10/21 200 lb (90.7 kg)   03/13/20 248 lb 9.6 oz (112.8 kg)       Physical examination:  General: awake alert, oriented x3, no abnormal position or movements. HEENT: normocephalic non-traumatic, no exophthalmos   Neck: supple, no LN enlargement, no thyromegaly, no thyroid tenderness, no JVD. Pulm: Clear equal air entry no added sounds, no wheezing or rhonchi    CVS: S1 + S2, no murmur, no heave. Dorsalis pedis pulse palpable   Abd: soft lax, no tenderness, no organomegaly, audible bowel sounds. Skin: warm, no lesions, no rash.  No callus, no Ulcers, No acanthosis nigricans  Musculoskeletal: No back tenderness, no kyphosis/scoliosis    Neuro: CN intact, Monofilament sensation decreased bilateral , muscle power normal  Psych: normal mood, and affect    Review of Laboratory Data:  I personally reviewed the following lab:  Lab Results   Component Value Date/Time    WBC 3.9 (L) 12/03/2021 09:45 AM    RBC 4.80 12/03/2021 09:45 AM    HGB 15.2 12/03/2021 09:45 AM    HCT 44.8 12/03/2021 09:45 AM    MCV 93.3 12/03/2021 09:45 AM    MCH 31.7 12/03/2021 09:45 AM    MCHC 33.9 12/03/2021 09:45 AM    RDW 13.3 12/03/2021 09:45 AM     12/03/2021 09:45 AM    MPV 13.2 (H) 12/03/2021 09:45 AM      Lab Results   Component Value Date/Time     01/02/2023 01:40 PM    K 4.1 01/02/2023 01:40 PM    K 4.0 09/29/2021 12:30 PM    CO2 24 01/02/2023 01:40 PM    BUN 19 01/02/2023 01:40 PM    CREATININE 1.1 01/02/2023 01:40 PM    CALCIUM 9.0 01/02/2023 01:40 PM    LABGLOM 81 01/02/2023 01:40 PM    GFRAA >60 12/03/2021 09:45 AM      Lab Results   Component Value Date/Time    TSH 6.850 (H) 12/03/2021 09:45 AM    T4FREE 1.21 07/17/2016 07:42 AM     Lab Results   Component Value Date/Time    LABA1C 8.1 11/17/2022 09:14 AM    GLUCOSE 200 01/02/2023 01:40 PM    MALBCR 43.4 01/02/2023 12:00 AM    LABMICR 106.6 01/02/2023 01:40 PM    LABCREA 245.8 01/02/2023 01:40 PM     Lab Results   Component Value Date/Time    LABA1C 8.1 11/17/2022 09:14 AM    LABA1C 8.3 12/03/2021 09:45 AM    LABA1C 9.3 09/29/2021 12:30 PM     Lab Results   Component Value Date/Time    TRIG 125 01/02/2023 01:40 PM    HDL 33 01/02/2023 01:40 PM    LDLCALC 132 01/02/2023 12:00 AM    CHOL 184 01/02/2023 01:40 PM    CHOL 184 01/02/2023 12:00 AM     Lab Results   Component Value Date/Time    VITD25 16 09/28/2021 06:01 AM    VITD25 24 06/04/2021 09:30 AM       ASSESSMENT & RECOMMENDATIONS   Rachel Ogles Kuttler, a 33 y.o.-old male seen in for the following issues     Diabetes Mellitus Type 1     Patient's diabetes is uncontrol   Change DM regimen to [place] 45  units daily, Humalog 1u:5g of carbs + sliding scale 3:50>150   Continue using CGM   and at bedtime and send BS readings to our office in a week.   Discussed with patient A1c and blood sugar goals   Patient will need routine diabetes maintenance and prevention  Diabetes labs before next visit     Continuous Glucose Monitoring (CGM) download and interpretation   I personally reviewed and interpreted continuous glucose monitor (CGM) download. Time in range 40%, high 58%, low  2%. CGM report was discussed with patient including blood glucose patterns, percentages of blood glucose at goal, above goal and below goal. Insulin dosages/antidiabetic regimen was adjusted according to CGM download. Full CGM was scanned under media. Dietary noncompliance  Discussed with patient the importance of eating consistent carbohydrate meals, avoiding high glycemic index food. Also, discussed with patient the risk and negative consequences of dietary noncompliance on blood glucose control, blood pressure and weight    I personally reviewed external notes from PCP and other patient's care team providers, and personally interpreted labs associated with the above diagnosis. I also ordered labs to further assess and manage the above addressed medical conditions. Return in about 4 months (around 5/9/2023) for VitD deficiency, DM type 1. The above issues were reviewed with the patient who understood and agreed with the plan. Thank you for allowing us to participate in the care of this patient. Please do not hesitate to contact us with any additional questions. Diagnosis Orders   1. Poorly controlled diabetes mellitus (HCC)  Hemoglobin A1C    AK CONTINUOUS GLUCOSE MONITORING ANALYSIS I&R      2. Vitamin D deficiency        3. Dietary noncompliance              Prachi Del Real MD  Endocrinologist, [facility] Diabetes Care and Endocrinology   [address] [place] [address]   Phone: [phone]  Fax: [phone]  --------------------------------------------  An electronic signature was used to authenticate this note.  Sigifredo Mcadams MD on 1/9/2023 at 9:03 AM

## 2023-03-01 DIAGNOSIS — E11.65 POORLY CONTROLLED DIABETES MELLITUS (HCC): Primary | ICD-10-CM

## 2023-03-27 DIAGNOSIS — E11.65 POORLY CONTROLLED DIABETES MELLITUS (HCC): ICD-10-CM

## 2023-03-27 RX ORDER — INSULIN PMP CART,AUT,G6/7,CNTR
EACH SUBCUTANEOUS
Qty: 30 EACH | Refills: 5 | Status: SHIPPED | OUTPATIENT
Start: 2023-03-27

## 2023-07-11 ENCOUNTER — OFFICE VISIT (OUTPATIENT)
Dept: ENDOCRINOLOGY | Age: 28
End: 2023-07-11
Payer: COMMERCIAL

## 2023-07-11 VITALS
BODY MASS INDEX: 33.51 KG/M2 | HEART RATE: 80 BPM | DIASTOLIC BLOOD PRESSURE: 84 MMHG | SYSTOLIC BLOOD PRESSURE: 124 MMHG | OXYGEN SATURATION: 97 % | WEIGHT: 254 LBS

## 2023-07-11 DIAGNOSIS — R80.9 MICROALBUMINURIA: ICD-10-CM

## 2023-07-11 DIAGNOSIS — E55.9 VITAMIN D DEFICIENCY: ICD-10-CM

## 2023-07-11 DIAGNOSIS — E11.65 POORLY CONTROLLED DIABETES MELLITUS (HCC): ICD-10-CM

## 2023-07-11 DIAGNOSIS — E10.9 TYPE 1 DIABETES MELLITUS WITHOUT COMPLICATION (HCC): Primary | ICD-10-CM

## 2023-07-11 LAB — HBA1C MFR BLD: 6.9 %

## 2023-07-11 PROCEDURE — 99214 OFFICE O/P EST MOD 30 MIN: CPT | Performed by: FAMILY MEDICINE

## 2023-07-11 PROCEDURE — 83037 HB GLYCOSYLATED A1C HOME DEV: CPT | Performed by: NURSE PRACTITIONER

## 2023-07-11 PROCEDURE — 3044F HG A1C LEVEL LT 7.0%: CPT | Performed by: FAMILY MEDICINE

## 2023-07-11 PROCEDURE — 95251 CONT GLUC MNTR ANALYSIS I&R: CPT | Performed by: NURSE PRACTITIONER

## 2023-07-11 RX ORDER — INSULIN PMP CART,AUT,G6/7,CNTR
EACH SUBCUTANEOUS
Qty: 45 EACH | Refills: 5 | Status: SHIPPED | OUTPATIENT
Start: 2023-07-11

## 2023-07-11 NOTE — PROGRESS NOTES
[address] [facility] Department of Endocrinology Diabetes and Metabolism   [address] [facility],[address] [address]   Phone: [phone]  Fax: [phone]    Date of Service: 7/11/2023  Primary Care Physician: FLAVIO Gary CNP  Referring physician: No ref. provider found  Provider: FLAVIO Dunaway CNP    Reason for the visit:  DM type 1     History of Present Illness: The history is provided by the patient. No  was used. Accuracy of the patient data is excellent. Shannon Pelaez is a very pleasant 33 y.o. male seen today for diabetes management     Shannon Pelaez was diagnosed with diabetes at age  7 and currently on OmniPod 5 with Dexcom in March 2023    Pump settings: Basal 12 am 1.35, CR 6, ISF 26, BG target 120-140 AIT 3    TIR  57%  Hyperglycemia 43%  Lows 0%  AVG BS  158  GMI  7.6%  TDD  80.2    Was without insulin Pods for 3 weeks  Has some late boluses with insulin stacking and missed boluses causing hyperglycemia around lunch/dinner. Did not complete diabetes labs.     Most recent A1c results summarized below  Lab Results   Component Value Date/Time    LABA1C 6.9 07/11/2023 03:32 PM    LABA1C 6.7 04/11/2023 03:04 PM    LABA1C 8.1 11/17/2022 09:14 AM     Patient reported no hypoglycemic episodes  The patient has been mindful of what has been eating and was following diabetes diet    I reviewed current medications and the patient has no issues with diabetes medications  The patient is up to date with eye exam. No h/o diabetic retinopathy  The patient  performs his own feet care  Microvascular complications:  No Retinopathy, Nephropathy or Neuropathy   Macrovascular complications: no CAD, PVD, or Stroke  The patient refuses Flushot     PAST MEDICAL HISTORY   Past Medical History:   Diagnosis Date    Diabetes mellitus ([address])     Lactic acid increased 6/12/2016       PAST SURGICAL HISTORY   Past Surgical History:   Procedure Laterality Date

## 2023-10-30 DIAGNOSIS — E11.65 POORLY CONTROLLED DIABETES MELLITUS (HCC): ICD-10-CM

## 2023-10-31 RX ORDER — PROCHLORPERAZINE 25 MG/1
SUPPOSITORY RECTAL
Qty: 1 EACH | Refills: 3 | Status: SHIPPED | OUTPATIENT
Start: 2023-10-31

## 2023-11-07 ENCOUNTER — OFFICE VISIT (OUTPATIENT)
Dept: ENDOCRINOLOGY | Age: 28
End: 2023-11-07

## 2023-11-07 VITALS
RESPIRATION RATE: 18 BRPM | SYSTOLIC BLOOD PRESSURE: 146 MMHG | DIASTOLIC BLOOD PRESSURE: 100 MMHG | HEIGHT: 73 IN | WEIGHT: 264 LBS | BODY MASS INDEX: 34.99 KG/M2 | OXYGEN SATURATION: 98 % | HEART RATE: 87 BPM

## 2023-11-07 DIAGNOSIS — E66.09 CLASS 1 OBESITY DUE TO EXCESS CALORIES WITH SERIOUS COMORBIDITY AND BODY MASS INDEX (BMI) OF 34.0 TO 34.9 IN ADULT: ICD-10-CM

## 2023-11-07 DIAGNOSIS — E55.9 VITAMIN D DEFICIENCY: ICD-10-CM

## 2023-11-07 DIAGNOSIS — E10.9 TYPE 1 DIABETES MELLITUS WITHOUT COMPLICATION (HCC): Primary | ICD-10-CM

## 2023-11-07 DIAGNOSIS — R80.9 MICROALBUMINURIA: ICD-10-CM

## 2023-11-07 LAB — HBA1C MFR BLD: 6.7 %

## 2023-11-07 NOTE — PROGRESS NOTES
[facility] Department of Endocrinology Diabetes and Metabolism   [address] [facility] [facility], [address] [place] [address]  Phone: [phone]  Fax: [phone]    Date of Service: 11/7/2023  Primary Care Physician: FLAVIO Jimenez CNP  Referring physician: No ref. provider found  Provider: FLAVIO Us NP    Reason for the visit:  DM type 1     History of Present Illness: The history is provided by the patient. No  was used. Accuracy of the patient data is excellent. Miller Blank is a very pleasant 33 y.o. male seen today for diabetes management     Miller Blank was diagnosed with diabetes at age  7 and currently on OmniPod 5 with Dexcom in March 2023    Since LAST OV has gone without pump and dexcom for 2 months due to not having  --> he did not call the office to tell us    Previous Pump settings: Basal 12 am 1.35, CR 5, ISF 23, BG target 120-140 ,AIT 2:30    He has been doing MDI  Tresiba 45 units in PM, Novolog  1: 8 CHO  + Low SS    He is performing fingersticks     FBS  180's      Did not complete diabetes labs.     Most recent A1c results summarized below  Lab Results   Component Value Date/Time    LABA1C 6.7 11/07/2023 03:15 PM    LABA1C 6.9 07/11/2023 03:32 PM    LABA1C 6.7 04/11/2023 03:04 PM     Patient reported no hypoglycemic episodes  The patient has been mindful of what has been eating and was following diabetes diet    I reviewed current medications and the patient has no issues with diabetes medications  The patient is up to date with eye exam. No h/o diabetic retinopathy  The patient  performs his own feet care  Microvascular complications:  No Retinopathy, Nephropathy or Neuropathy   Macrovascular complications: no CAD, PVD, or Stroke  The patient refuses Flushot     PAST MEDICAL HISTORY   Past Medical History:   Diagnosis Date    Diabetes mellitus ([address])     Lactic acid increased 6/12/2016       PAST SURGICAL

## 2024-02-10 ENCOUNTER — HOSPITAL ENCOUNTER (OUTPATIENT)
Age: 29
Discharge: HOME | End: 2024-02-10
Payer: COMMERCIAL

## 2024-02-10 DIAGNOSIS — R80.9 MICROALBUMINURIA: ICD-10-CM

## 2024-02-10 DIAGNOSIS — E55.9 VITAMIN D DEFICIENCY: ICD-10-CM

## 2024-02-10 DIAGNOSIS — E11.65 POORLY CONTROLLED DIABETES MELLITUS (HCC): ICD-10-CM

## 2024-02-10 LAB
25(OH)D3 SERPL-MCNC: 16.8 NG/ML (ref 30–100)
ANION GAP SERPL CALCULATED.3IONS-SCNC: 14 MMOL/L (ref 7–16)
BUN SERPL-MCNC: 14 MG/DL (ref 6–20)
CALCIUM SERPL-MCNC: 10 MG/DL (ref 8.6–10.2)
CHLORIDE SERPL-SCNC: 100 MMOL/L (ref 98–107)
CHOLEST SERPL-MCNC: 220 MG/DL
CO2 SERPL-SCNC: 24 MMOL/L (ref 22–29)
CREAT SERPL-MCNC: 0.9 MG/DL (ref 0.7–1.2)
CREAT UR-MCNC: 87.7 MG/DL (ref 40–278)
ERYTHROCYTE [DISTWIDTH] IN BLOOD BY AUTOMATED COUNT: 13.6 % (ref 11.5–15)
GFR SERPL CREATININE-BSD FRML MDRD: >60 ML/MIN/1.73M2
GLUCOSE SERPL-MCNC: 254 MG/DL (ref 74–99)
HBA1C MFR BLD: 7.1 % (ref 4–5.6)
HCT VFR BLD AUTO: 50.4 % (ref 37–54)
HDLC SERPL-MCNC: 42 MG/DL
HGB BLD-MCNC: 16.7 G/DL (ref 12.5–16.5)
LDLC SERPL CALC-MCNC: 157 MG/DL
MCH RBC QN AUTO: 30.6 PG (ref 26–35)
MCHC RBC AUTO-ENTMCNC: 33.1 G/DL (ref 32–34.5)
MCV RBC AUTO: 92.3 FL (ref 80–99.9)
MICROALBUMIN UR-MCNC: 117 MG/L (ref 0–19)
MICROALBUMIN/CREAT UR-RTO: 133 MCG/MG CREAT (ref 0–30)
PLATELET # BLD AUTO: 195 K/UL (ref 130–450)
PMV BLD AUTO: 12.4 FL (ref 7–12)
POTASSIUM SERPL-SCNC: 4.3 MMOL/L (ref 3.5–5)
RBC # BLD AUTO: 5.46 M/UL (ref 3.8–5.8)
SODIUM SERPL-SCNC: 138 MMOL/L (ref 132–146)
TRIGL SERPL-MCNC: 106 MG/DL
VLDLC SERPL CALC-MCNC: 21 MG/DL
WBC OTHER # BLD: 6.8 K/UL (ref 4.5–11.5)

## 2024-02-10 PROCEDURE — 85027 COMPLETE CBC AUTOMATED: CPT

## 2024-02-10 PROCEDURE — 80061 LIPID PANEL: CPT

## 2024-02-10 PROCEDURE — 82043 UR ALBUMIN QUANTITATIVE: CPT

## 2024-02-10 PROCEDURE — 36415 COLL VENOUS BLD VENIPUNCTURE: CPT

## 2024-02-10 PROCEDURE — 82306 VITAMIN D 25 HYDROXY: CPT

## 2024-02-10 PROCEDURE — 80048 BASIC METABOLIC PNL TOTAL CA: CPT

## 2024-02-10 PROCEDURE — 83036 HEMOGLOBIN GLYCOSYLATED A1C: CPT

## 2024-02-10 PROCEDURE — 82570 ASSAY OF URINE CREATININE: CPT

## 2024-02-12 ENCOUNTER — OFFICE VISIT (OUTPATIENT)
Dept: ENDOCRINOLOGY | Age: 29
End: 2024-02-12
Payer: COMMERCIAL

## 2024-02-12 VITALS
DIASTOLIC BLOOD PRESSURE: 79 MMHG | OXYGEN SATURATION: 97 % | HEIGHT: 73 IN | BODY MASS INDEX: 33.27 KG/M2 | SYSTOLIC BLOOD PRESSURE: 144 MMHG | WEIGHT: 251 LBS | RESPIRATION RATE: 18 BRPM | HEART RATE: 95 BPM

## 2024-02-12 DIAGNOSIS — E78.2 MIXED HYPERLIPIDEMIA: ICD-10-CM

## 2024-02-12 DIAGNOSIS — E66.09 CLASS 1 OBESITY DUE TO EXCESS CALORIES WITH SERIOUS COMORBIDITY AND BODY MASS INDEX (BMI) OF 33.0 TO 33.9 IN ADULT: ICD-10-CM

## 2024-02-12 DIAGNOSIS — R80.9 MICROALBUMINURIA: ICD-10-CM

## 2024-02-12 DIAGNOSIS — E55.9 VITAMIN D DEFICIENCY: ICD-10-CM

## 2024-02-12 DIAGNOSIS — E10.9 TYPE 1 DIABETES MELLITUS WITHOUT COMPLICATION (HCC): Primary | ICD-10-CM

## 2024-02-12 PROCEDURE — 99214 OFFICE O/P EST MOD 30 MIN: CPT | Performed by: NURSE PRACTITIONER

## 2024-02-12 PROCEDURE — 3051F HG A1C>EQUAL 7.0%<8.0%: CPT | Performed by: NURSE PRACTITIONER

## 2024-02-12 RX ORDER — PROCHLORPERAZINE 25 MG/1
SUPPOSITORY RECTAL
Qty: 1 EACH | Refills: 3 | Status: SHIPPED | OUTPATIENT
Start: 2024-02-12

## 2024-02-12 RX ORDER — LISINOPRIL 2.5 MG/1
2.5 TABLET ORAL DAILY
Qty: 30 TABLET | Refills: 3 | Status: SHIPPED | OUTPATIENT
Start: 2024-02-12

## 2024-02-12 RX ORDER — PROCHLORPERAZINE 25 MG/1
SUPPOSITORY RECTAL
Qty: 3 EACH | Refills: 3 | Status: SHIPPED | OUTPATIENT
Start: 2024-02-12

## 2024-02-12 RX ORDER — ERGOCALCIFEROL 1.25 MG/1
50000 CAPSULE ORAL WEEKLY
Qty: 12 CAPSULE | Refills: 3 | Status: SHIPPED | OUTPATIENT
Start: 2024-02-12

## 2024-02-12 NOTE — PROGRESS NOTES
ODT) 4 MG disintegrating tablet Take 1 tablet by mouth every 8 hours as needed for Nausea or Vomiting 30 tablet 0     No current facility-administered medications for this visit.       Review of Systems  Constitutional: No fever, no chills, no diaphoresis, no generalized weakness.  HEENT: No blurred vision, No sore throat, no ear pain, no hair loss  Neck: denied any neck swelling, difficulty swallowing,   Cardio-pulmonary: No CP, SOB or palpitation, No orthopnea or PND. No cough or wheezing.  GI: No N/V/D, no constipation, No abdominal pain, no melena or hematochezia   : Denied any dysuria, hematuria, flank pain, discharge, or incontinence.   Skin: denied any rash, ulcer, Hirsute, or hyperpigmentation.   MSK: denied any joint deformity, joint pain/swelling, muscle pain, or back pain.  Neuro: no numbness, no tingling, no weakness    OBJECTIVE    BP (!) 144/79   Pulse 95   Resp 18   Ht 1.854 m (6' 0.99\")   Wt 113.9 kg (251 lb)   SpO2 97%   BMI 33.12 kg/m²   BP Readings from Last 4 Encounters:   02/12/24 (!) 144/79   11/07/23 (!) 146/100   07/11/23 124/84   04/11/23 131/87     Wt Readings from Last 6 Encounters:   02/12/24 113.9 kg (251 lb)   11/07/23 119.7 kg (264 lb)   07/11/23 115.2 kg (254 lb)   04/11/23 115.7 kg (255 lb)   01/09/23 116.6 kg (257 lb)   11/17/22 117 kg (258 lb)       Physical examination:  General: awake alert, oriented x3, no abnormal position or movements.  HEENT: normocephalic non-traumatic, no exophthalmos   Neck: supple, no LN enlargement, no thyromegaly, no thyroid tenderness, no JVD.  Pulm: Clear equal air entry no added sounds, no wheezing or rhonchi    CVS: S1 + S2, no murmur, no heave. Dorsalis pedis pulse palpable   Abd: soft lax, no tenderness, no organomegaly, audible bowel sounds.   Skin: warm, no lesions, no rash. No callus, no Ulcers, No acanthosis nigricans  Musculoskeletal: No back tenderness, no kyphosis/scoliosis    Neuro: CN intact, sensation present bilateral , muscle

## 2024-05-15 ENCOUNTER — OFFICE VISIT (OUTPATIENT)
Dept: ENDOCRINOLOGY | Age: 29
End: 2024-05-15
Payer: COMMERCIAL

## 2024-05-15 VITALS
DIASTOLIC BLOOD PRESSURE: 87 MMHG | HEIGHT: 72 IN | HEART RATE: 83 BPM | SYSTOLIC BLOOD PRESSURE: 128 MMHG | WEIGHT: 248 LBS | BODY MASS INDEX: 33.59 KG/M2

## 2024-05-15 DIAGNOSIS — R80.9 MICROALBUMINURIA: ICD-10-CM

## 2024-05-15 DIAGNOSIS — E66.09 CLASS 1 OBESITY DUE TO EXCESS CALORIES WITH SERIOUS COMORBIDITY AND BODY MASS INDEX (BMI) OF 33.0 TO 33.9 IN ADULT: ICD-10-CM

## 2024-05-15 DIAGNOSIS — E10.9 TYPE 1 DIABETES MELLITUS WITHOUT COMPLICATION (HCC): Primary | ICD-10-CM

## 2024-05-15 DIAGNOSIS — E78.2 MIXED HYPERLIPIDEMIA: ICD-10-CM

## 2024-05-15 DIAGNOSIS — E55.9 VITAMIN D DEFICIENCY: ICD-10-CM

## 2024-05-15 PROCEDURE — 83036 HEMOGLOBIN GLYCOSYLATED A1C: CPT | Performed by: NURSE PRACTITIONER

## 2024-05-15 PROCEDURE — 99214 OFFICE O/P EST MOD 30 MIN: CPT | Performed by: NURSE PRACTITIONER

## 2024-05-15 PROCEDURE — 3051F HG A1C>EQUAL 7.0%<8.0%: CPT | Performed by: NURSE PRACTITIONER

## 2024-05-15 NOTE — PROGRESS NOTES
[facility]  [facility] Department of Endocrinology Diabetes and Metabolism   [address] [facility] [address], [place] [address]  Phone: [phone]  Fax: [phone]    Date of Service: 5/15/2024  Primary Care Physician: Megan Argueta APRN - CNP  Referring physician: No ref. provider found  Provider: FLAVIO Syed NP    Reason for the visit:  DM type 1     History of Present Illness:  The history is provided by the patient. No  was used. Accuracy of the patient data is excellent.  Sam Choudhary is a very pleasant 29 y.o. male seen today for diabetes management     Sam Choudhary was diagnosed with diabetes at age  12 and currently on OmniPod 5 with Dexcom in March 2023    Insurance not covering PODS, has been using MDI and pump interchangeable (gets pods from a friend who has a surplus)    Previous Pump settings:   Basal 12 am 1.35, CR 5, ISF 23, BG target 120-140 ,AIT 2:30 ordered but presents today with:  Basal 3.2, CR 8, ISF 50, BS GOAL 120-180 AIT 4 HRS   HE STATES HE DID NOT CHANGE THE PUMP     He has been doing MDI  Tresiba 45 units in PM, Novolog  1: 8 CHO  + Low SS    He is USING THE DEXCOM    OMNIPOD DOWNLOAD:  Tir 56%  Lows 4%  Hyperglycemia 40%  Avg bs 170  Tdd 67.3 units    Most recent A1c results summarized below  Lab Results   Component Value Date/Time    LABA1C 7.1 02/10/2024 09:33 AM    LABA1C 6.7 11/07/2023 03:15 PM    LABA1C 6.9 07/11/2023 03:32 PM     Patient reported random  hypoglycemic episodes  The patient has been mindful of what has been eating and was following diabetes diet    I reviewed current medications and the patient has no issues with diabetes medications  The patient is up to date with eye exam. No h/o diabetic retinopathy  The patient  performs his own feet care  Microvascular complications:  No Retinopathy, Nephropathy or Neuropathy   Macrovascular complications: no CAD, PVD, or Stroke  The patient refuses Flushot

## 2024-05-16 LAB — HBA1C MFR BLD: 6.9 %

## 2024-05-21 ENCOUNTER — TELEPHONE (OUTPATIENT)
Dept: ENDOCRINOLOGY | Age: 29
End: 2024-05-21

## 2024-09-04 DIAGNOSIS — E11.65 POORLY CONTROLLED DIABETES MELLITUS (HCC): ICD-10-CM

## 2024-09-04 DIAGNOSIS — E10.9 TYPE 1 DIABETES MELLITUS WITHOUT COMPLICATION (HCC): ICD-10-CM

## 2024-09-05 RX ORDER — INSULIN PMP CART,AUT,G6/7,CNTR
EACH SUBCUTANEOUS
Qty: 45 EACH | Refills: 5 | Status: SHIPPED | OUTPATIENT
Start: 2024-09-05

## 2024-09-05 RX ORDER — PROCHLORPERAZINE 25 MG/1
SUPPOSITORY RECTAL
Qty: 3 EACH | Refills: 3 | Status: SHIPPED | OUTPATIENT
Start: 2024-09-05

## 2024-09-05 RX ORDER — PROCHLORPERAZINE 25 MG/1
SUPPOSITORY RECTAL
Qty: 1 EACH | Refills: 3 | Status: SHIPPED | OUTPATIENT
Start: 2024-09-05

## 2024-09-05 RX ORDER — INSULIN ASPART 100 [IU]/ML
INJECTION, SOLUTION INTRAVENOUS; SUBCUTANEOUS
Qty: 90 ML | Refills: 3 | Status: SHIPPED | OUTPATIENT
Start: 2024-09-05

## 2024-09-17 ENCOUNTER — OFFICE VISIT (OUTPATIENT)
Dept: ENDOCRINOLOGY | Age: 29
End: 2024-09-17
Payer: COMMERCIAL

## 2024-09-17 VITALS
SYSTOLIC BLOOD PRESSURE: 126 MMHG | HEIGHT: 72 IN | WEIGHT: 247 LBS | BODY MASS INDEX: 33.46 KG/M2 | DIASTOLIC BLOOD PRESSURE: 82 MMHG

## 2024-09-17 DIAGNOSIS — E10.9 TYPE 1 DIABETES MELLITUS WITHOUT COMPLICATION (HCC): Primary | ICD-10-CM

## 2024-09-17 DIAGNOSIS — E11.65 POORLY CONTROLLED DIABETES MELLITUS (HCC): ICD-10-CM

## 2024-09-17 DIAGNOSIS — E66.09 CLASS 1 OBESITY DUE TO EXCESS CALORIES WITH SERIOUS COMORBIDITY AND BODY MASS INDEX (BMI) OF 33.0 TO 33.9 IN ADULT: ICD-10-CM

## 2024-09-17 DIAGNOSIS — E78.2 MIXED HYPERLIPIDEMIA: ICD-10-CM

## 2024-09-17 DIAGNOSIS — R80.9 MICROALBUMINURIA: ICD-10-CM

## 2024-09-17 DIAGNOSIS — E55.9 VITAMIN D DEFICIENCY: ICD-10-CM

## 2024-09-17 LAB — HBA1C MFR BLD: 7.8 %

## 2024-09-17 PROCEDURE — 83036 HEMOGLOBIN GLYCOSYLATED A1C: CPT | Performed by: NURSE PRACTITIONER

## 2024-09-17 PROCEDURE — 3051F HG A1C>EQUAL 7.0%<8.0%: CPT | Performed by: NURSE PRACTITIONER

## 2024-09-17 PROCEDURE — 99214 OFFICE O/P EST MOD 30 MIN: CPT | Performed by: NURSE PRACTITIONER

## 2024-09-17 RX ORDER — INSULIN ASPART 100 [IU]/ML
INJECTION, SOLUTION INTRAVENOUS; SUBCUTANEOUS
Qty: 90 ML | Refills: 3 | Status: SHIPPED | OUTPATIENT
Start: 2024-09-17

## 2024-09-17 RX ORDER — BLOOD SUGAR DIAGNOSTIC
1 STRIP MISCELLANEOUS DAILY
Qty: 100 EACH | Refills: 3 | Status: SHIPPED | OUTPATIENT
Start: 2024-09-17

## 2024-09-17 RX ORDER — PROCHLORPERAZINE 25 MG/1
SUPPOSITORY RECTAL
Qty: 1 EACH | Refills: 3 | Status: SHIPPED | OUTPATIENT
Start: 2024-09-17

## 2024-09-17 RX ORDER — PROCHLORPERAZINE 25 MG/1
SUPPOSITORY RECTAL
Qty: 3 EACH | Refills: 3 | Status: SHIPPED | OUTPATIENT
Start: 2024-09-17

## 2024-09-17 RX ORDER — INSULIN PMP CART,AUT,G6/7,CNTR
EACH SUBCUTANEOUS
Qty: 45 EACH | Refills: 5 | Status: SHIPPED | OUTPATIENT
Start: 2024-09-17

## 2024-09-25 ENCOUNTER — TELEPHONE (OUTPATIENT)
Dept: ENDOCRINOLOGY | Age: 29
End: 2024-09-25

## 2024-10-02 DIAGNOSIS — E10.9 TYPE 1 DIABETES MELLITUS WITHOUT COMPLICATION (HCC): Primary | ICD-10-CM

## 2024-10-02 RX ORDER — INSULIN PMP CART,AUT,G6/7,CNTR
1 EACH SUBCUTANEOUS ONCE
Qty: 1 KIT | Refills: 0 | Status: SHIPPED | OUTPATIENT
Start: 2024-10-02 | End: 2024-10-02

## 2024-10-08 DIAGNOSIS — E10.9 TYPE 1 DIABETES MELLITUS WITHOUT COMPLICATION (HCC): ICD-10-CM

## 2024-10-08 RX ORDER — INSULIN PMP CART,AUT,G6/7,CNTR
1 EACH SUBCUTANEOUS ONCE
Qty: 1 KIT | Refills: 0 | Status: SHIPPED | OUTPATIENT
Start: 2024-10-08 | End: 2024-10-08

## 2024-11-24 DIAGNOSIS — E10.9 TYPE 1 DIABETES MELLITUS WITHOUT COMPLICATION (HCC): ICD-10-CM

## 2024-11-25 RX ORDER — PROCHLORPERAZINE 25 MG/1
SUPPOSITORY RECTAL
Qty: 3 EACH | Refills: 3 | OUTPATIENT
Start: 2024-11-25

## 2025-01-17 ENCOUNTER — TELEPHONE (OUTPATIENT)
Dept: ENDOCRINOLOGY | Age: 30
End: 2025-01-17

## 2025-01-17 ENCOUNTER — HOSPITAL ENCOUNTER (OUTPATIENT)
Age: 30
Discharge: HOME | End: 2025-01-17
Payer: COMMERCIAL

## 2025-01-17 DIAGNOSIS — E78.2 MIXED HYPERLIPIDEMIA: ICD-10-CM

## 2025-01-17 DIAGNOSIS — E55.9 VITAMIN D DEFICIENCY: ICD-10-CM

## 2025-01-17 DIAGNOSIS — E10.9 TYPE 1 DIABETES MELLITUS WITHOUT COMPLICATION (HCC): Primary | ICD-10-CM

## 2025-01-17 DIAGNOSIS — R80.9 MICROALBUMINURIA: ICD-10-CM

## 2025-01-17 DIAGNOSIS — E10.9 TYPE 1 DIABETES MELLITUS WITHOUT COMPLICATION (HCC): ICD-10-CM

## 2025-01-17 LAB
25(OH)D3 SERPL-MCNC: 19.5 NG/ML (ref 30–100)
ANION GAP SERPL CALCULATED.3IONS-SCNC: 13 MMOL/L (ref 7–16)
BUN SERPL-MCNC: 12 MG/DL (ref 6–20)
CALCIUM SERPL-MCNC: 9.4 MG/DL (ref 8.6–10.2)
CHLORIDE SERPL-SCNC: 99 MMOL/L (ref 98–107)
CHOLEST SERPL-MCNC: 209 MG/DL
CO2 SERPL-SCNC: 26 MMOL/L (ref 22–29)
CREAT SERPL-MCNC: 1.1 MG/DL (ref 0.7–1.2)
CREAT UR-MCNC: 81.9 MG/DL (ref 40–278)
ERYTHROCYTE [DISTWIDTH] IN BLOOD BY AUTOMATED COUNT: 12.5 % (ref 11.5–15)
GFR, ESTIMATED: >90 ML/MIN/1.73M2
GLUCOSE SERPL-MCNC: 319 MG/DL (ref 74–99)
HBA1C MFR BLD: 8.2 % (ref 4–5.6)
HCT VFR BLD AUTO: 49 % (ref 37–54)
HDLC SERPL-MCNC: 39 MG/DL
HGB BLD-MCNC: 16.6 G/DL (ref 12.5–16.5)
LDLC SERPL CALC-MCNC: 140 MG/DL
MCH RBC QN AUTO: 30.1 PG (ref 26–35)
MCHC RBC AUTO-ENTMCNC: 33.9 G/DL (ref 32–34.5)
MCV RBC AUTO: 88.9 FL (ref 80–99.9)
MICROALBUMIN UR-MCNC: 37 MG/L (ref 0–19)
MICROALBUMIN/CREAT UR-RTO: 45 MCG/MG CREAT (ref 0–30)
PLATELET # BLD AUTO: 168 K/UL (ref 130–450)
PMV BLD AUTO: 12.8 FL (ref 7–12)
POTASSIUM SERPL-SCNC: 4.6 MMOL/L (ref 3.5–5)
RBC # BLD AUTO: 5.51 M/UL (ref 3.8–5.8)
SODIUM SERPL-SCNC: 138 MMOL/L (ref 132–146)
TRIGL SERPL-MCNC: 152 MG/DL
VLDLC SERPL CALC-MCNC: 30 MG/DL
WBC OTHER # BLD: 5.5 K/UL (ref 4.5–11.5)

## 2025-01-17 PROCEDURE — 80061 LIPID PANEL: CPT

## 2025-01-17 PROCEDURE — 85027 COMPLETE CBC AUTOMATED: CPT

## 2025-01-17 PROCEDURE — 82570 ASSAY OF URINE CREATININE: CPT

## 2025-01-17 PROCEDURE — 83036 HEMOGLOBIN GLYCOSYLATED A1C: CPT

## 2025-01-17 PROCEDURE — 80048 BASIC METABOLIC PNL TOTAL CA: CPT

## 2025-01-17 PROCEDURE — 36415 COLL VENOUS BLD VENIPUNCTURE: CPT

## 2025-01-17 PROCEDURE — 82306 VITAMIN D 25 HYDROXY: CPT

## 2025-01-17 PROCEDURE — 82043 UR ALBUMIN QUANTITATIVE: CPT

## 2025-01-18 ENCOUNTER — TELEPHONE (OUTPATIENT)
Dept: ENDOCRINOLOGY | Age: 30
End: 2025-01-18

## 2025-01-18 NOTE — TELEPHONE ENCOUNTER
I will review labs with pt at upcoming appt. Please fax labs to his PCP. BS and A1c elevated. Chol and LDL elevated. Vit D is low, Protein in urine improved

## 2025-01-22 ENCOUNTER — OFFICE VISIT (OUTPATIENT)
Dept: ENDOCRINOLOGY | Age: 30
End: 2025-01-22
Payer: COMMERCIAL

## 2025-01-22 VITALS
SYSTOLIC BLOOD PRESSURE: 124 MMHG | TEMPERATURE: 98.2 F | BODY MASS INDEX: 35.62 KG/M2 | DIASTOLIC BLOOD PRESSURE: 80 MMHG | HEIGHT: 72 IN | WEIGHT: 263 LBS

## 2025-01-22 DIAGNOSIS — E78.2 MIXED HYPERLIPIDEMIA: ICD-10-CM

## 2025-01-22 DIAGNOSIS — E66.01 CLASS 2 SEVERE OBESITY DUE TO EXCESS CALORIES WITH SERIOUS COMORBIDITY AND BODY MASS INDEX (BMI) OF 35.0 TO 35.9 IN ADULT: ICD-10-CM

## 2025-01-22 DIAGNOSIS — E55.9 VITAMIN D DEFICIENCY: ICD-10-CM

## 2025-01-22 DIAGNOSIS — E10.9 TYPE 1 DIABETES MELLITUS WITHOUT COMPLICATION (HCC): Primary | ICD-10-CM

## 2025-01-22 DIAGNOSIS — R80.9 MICROALBUMINURIA: ICD-10-CM

## 2025-01-22 DIAGNOSIS — E66.812 CLASS 2 SEVERE OBESITY DUE TO EXCESS CALORIES WITH SERIOUS COMORBIDITY AND BODY MASS INDEX (BMI) OF 35.0 TO 35.9 IN ADULT: ICD-10-CM

## 2025-01-22 PROCEDURE — 3052F HG A1C>EQUAL 8.0%<EQUAL 9.0%: CPT | Performed by: NURSE PRACTITIONER

## 2025-01-22 PROCEDURE — 95251 CONT GLUC MNTR ANALYSIS I&R: CPT | Performed by: NURSE PRACTITIONER

## 2025-01-22 PROCEDURE — 99214 OFFICE O/P EST MOD 30 MIN: CPT | Performed by: NURSE PRACTITIONER

## 2025-01-22 NOTE — PROGRESS NOTES
[facility]  [facility] Department of Endocrinology Diabetes and Metabolism   [address] [facility] [address], [place] [address]  Phone: [phone]  Fax: [phone]    Date of Service: 1/22/2025  Primary Care Physician: Megan Argueta APRN - CNP  Referring physician: No ref. provider found  Provider: FLAVIO Syed NP    Reason for the visit:  DM type 1     History of Present Illness:  The history is provided by the patient. No  was used. Accuracy of the patient data is excellent.  Sam Choudhary is a very pleasant 29 y.o. male seen today for diabetes management     Sam Choudhary was diagnosed with diabetes at age  12 and currently on OmniPod 5 with Dexcom in March 2023    Since last OV after situating his insurance with his supplies, his company switched insurance companies again. Melody [place] states they need  prior authorization    Pump settings per download:   Basal 12 am 1.25, CR8, ISF 50 BG target 120-150 ,AIT 4    What was ordered:  Basal 12 am 1.35, CR 6, ISF 23, BG target 120-140, AIT 2:30     MDI regimen:  Tresiba 35-40 units in PM, Novolog  1: 8 CHO  + Low SS      TIR  55%  Hyperglycemia 42%  Lows  3%  Avg 177  TDD 78.3 units     Upgraded from PDM to phone BEBA and entered his pump settings in wrong    Most recent A1c results summarized below  Lab Results   Component Value Date/Time    LABA1C 8.2 01/17/2025 09:17 AM    LABA1C 7.8 09/17/2024 03:12 PM    LABA1C 6.9 05/16/2024 01:10 PM     Patient reported random  hypoglycemic episodes  The patient has been mindful of what has been eating and was following diabetes diet    I reviewed current medications and the patient has no issues with diabetes medications  The patient is up to date with eye exam. No h/o diabetic retinopathy  The patient  performs his own feet care  Microvascular complications:  No Retinopathy, Nephropathy or Neuropathy   Macrovascular complications: no CAD, PVD, or Stroke  The

## 2025-01-29 ENCOUNTER — TELEPHONE (OUTPATIENT)
Dept: ENDOCRINOLOGY | Age: 30
End: 2025-01-29

## 2025-03-18 DIAGNOSIS — E10.9 TYPE 1 DIABETES MELLITUS WITHOUT COMPLICATION: ICD-10-CM

## 2025-03-19 RX ORDER — PROCHLORPERAZINE 25 MG/1
SUPPOSITORY RECTAL
Qty: 9 EACH | Refills: 3 | Status: SHIPPED | OUTPATIENT
Start: 2025-03-19

## 2025-05-08 ENCOUNTER — OFFICE VISIT (OUTPATIENT)
Dept: ENDOCRINOLOGY | Age: 30
End: 2025-05-08
Payer: COMMERCIAL

## 2025-05-08 VITALS
RESPIRATION RATE: 16 BRPM | OXYGEN SATURATION: 99 % | WEIGHT: 254 LBS | SYSTOLIC BLOOD PRESSURE: 128 MMHG | HEART RATE: 83 BPM | HEIGHT: 72 IN | BODY MASS INDEX: 34.4 KG/M2 | DIASTOLIC BLOOD PRESSURE: 85 MMHG

## 2025-05-08 DIAGNOSIS — E55.9 VITAMIN D DEFICIENCY: ICD-10-CM

## 2025-05-08 DIAGNOSIS — E66.811 CLASS 1 OBESITY DUE TO EXCESS CALORIES WITH SERIOUS COMORBIDITY AND BODY MASS INDEX (BMI) OF 34.0 TO 34.9 IN ADULT: ICD-10-CM

## 2025-05-08 DIAGNOSIS — E66.09 CLASS 1 OBESITY DUE TO EXCESS CALORIES WITH SERIOUS COMORBIDITY AND BODY MASS INDEX (BMI) OF 34.0 TO 34.9 IN ADULT: ICD-10-CM

## 2025-05-08 DIAGNOSIS — E78.2 MIXED HYPERLIPIDEMIA: ICD-10-CM

## 2025-05-08 DIAGNOSIS — R80.9 MICROALBUMINURIA: ICD-10-CM

## 2025-05-08 DIAGNOSIS — E10.9 TYPE 1 DIABETES MELLITUS WITHOUT COMPLICATION (HCC): Primary | ICD-10-CM

## 2025-05-08 LAB — HBA1C MFR BLD: 7.2 %

## 2025-05-08 PROCEDURE — 99214 OFFICE O/P EST MOD 30 MIN: CPT | Performed by: NURSE PRACTITIONER

## 2025-05-08 PROCEDURE — 95251 CONT GLUC MNTR ANALYSIS I&R: CPT | Performed by: NURSE PRACTITIONER

## 2025-05-08 PROCEDURE — 83036 HEMOGLOBIN GLYCOSYLATED A1C: CPT | Performed by: NURSE PRACTITIONER

## 2025-05-08 PROCEDURE — 3051F HG A1C>EQUAL 7.0%<8.0%: CPT | Performed by: NURSE PRACTITIONER

## 2025-05-08 RX ORDER — LISINOPRIL 2.5 MG/1
2.5 TABLET ORAL DAILY
Qty: 90 TABLET | Refills: 3 | Status: SHIPPED | OUTPATIENT
Start: 2025-05-08

## 2025-05-08 NOTE — PROGRESS NOTES
[facility]  [facility] Department of Endocrinology Diabetes and Metabolism   [address], [place] [address]  Phone: [phone]  Fax: [phone]    Date of Service: 5/8/2025  Primary Care Physician: Megan Argueta APRN - CNP  Referring physician: No ref. provider found  Provider: FLAVIO Syed NP    Reason for the visit:  DM type 1     History of Present Illness:  The history is provided by the patient. No  was used. Accuracy of the patient data is excellent.  Sam Choudhary is a very pleasant 30 y.o. male seen today for diabetes management     Sam Choudhary was diagnosed with diabetes at age  12 and currently on OmniPod 5 with Dexcom in March 2023    Since last OV after situating his insurance with his supplies, his company switched insurance companies again. Melody [place] states they need  prior authorization    Pump settings per download:     Basal 12 am 1.6, CR 6, ISF 25, BG target 120-120, AIT 2:30     MDI regimen:  Tresiba 35-40 units in PM, Novolog  1: 8 CHO  + Low SS      TIR  58%  Hyperglycemia 38%  Lows  4%  Avg 173  TDD 80.1 units     Most recent A1c results summarized below  Lab Results   Component Value Date/Time    LABA1C 7.2 05/08/2025 03:22 PM    LABA1C 8.2 01/17/2025 09:17 AM    LABA1C 7.8 09/17/2024 03:12 PM     Patient reported random  hypoglycemic episodes  The patient has been mindful of what has been eating and was following diabetes diet    I reviewed current medications and the patient has no issues with diabetes medications  The patient is up to date with eye exam. No h/o diabetic retinopathy  The patient  performs his own feet care  Microvascular complications:  No Retinopathy, Nephropathy or Neuropathy   Macrovascular complications: no CAD, PVD, or Stroke  The patient refuses Flushot     PAST MEDICAL HISTORY   Past Medical History:   Diagnosis Date    Diabetes mellitus (HCC)     Lactic acid increased  Yes

## 2025-09-03 ENCOUNTER — OFFICE VISIT (OUTPATIENT)
Dept: ENDOCRINOLOGY | Age: 30
End: 2025-09-03
Payer: COMMERCIAL

## 2025-09-03 VITALS
DIASTOLIC BLOOD PRESSURE: 80 MMHG | OXYGEN SATURATION: 99 % | SYSTOLIC BLOOD PRESSURE: 125 MMHG | HEART RATE: 76 BPM | BODY MASS INDEX: 33.27 KG/M2 | RESPIRATION RATE: 18 BRPM | HEIGHT: 73 IN | TEMPERATURE: 97.7 F | WEIGHT: 251 LBS

## 2025-09-03 DIAGNOSIS — E10.9 TYPE 1 DIABETES MELLITUS WITHOUT COMPLICATION (HCC): Primary | ICD-10-CM

## 2025-09-03 DIAGNOSIS — E55.9 VITAMIN D DEFICIENCY: ICD-10-CM

## 2025-09-03 DIAGNOSIS — E66.09 CLASS 1 OBESITY DUE TO EXCESS CALORIES WITH SERIOUS COMORBIDITY AND BODY MASS INDEX (BMI) OF 34.0 TO 34.9 IN ADULT: ICD-10-CM

## 2025-09-03 DIAGNOSIS — E78.2 MIXED HYPERLIPIDEMIA: ICD-10-CM

## 2025-09-03 DIAGNOSIS — R80.9 MICROALBUMINURIA: ICD-10-CM

## 2025-09-03 DIAGNOSIS — E66.811 CLASS 1 OBESITY DUE TO EXCESS CALORIES WITH SERIOUS COMORBIDITY AND BODY MASS INDEX (BMI) OF 34.0 TO 34.9 IN ADULT: ICD-10-CM

## 2025-09-03 LAB — HBA1C MFR BLD: 7.1 %

## 2025-09-03 PROCEDURE — 3051F HG A1C>EQUAL 7.0%<8.0%: CPT | Performed by: NURSE PRACTITIONER

## 2025-09-03 PROCEDURE — 99214 OFFICE O/P EST MOD 30 MIN: CPT | Performed by: NURSE PRACTITIONER

## 2025-09-03 PROCEDURE — 83036 HEMOGLOBIN GLYCOSYLATED A1C: CPT | Performed by: NURSE PRACTITIONER
